# Patient Record
Sex: MALE | Race: WHITE | Employment: UNEMPLOYED | URBAN - METROPOLITAN AREA
[De-identification: names, ages, dates, MRNs, and addresses within clinical notes are randomized per-mention and may not be internally consistent; named-entity substitution may affect disease eponyms.]

---

## 2017-11-27 ENCOUNTER — OFFICE VISIT (OUTPATIENT)
Dept: FAMILY MEDICINE CLINIC | Age: 47
End: 2017-11-27

## 2017-11-27 VITALS
SYSTOLIC BLOOD PRESSURE: 110 MMHG | WEIGHT: 201.2 LBS | HEIGHT: 71 IN | DIASTOLIC BLOOD PRESSURE: 60 MMHG | HEART RATE: 78 BPM | BODY MASS INDEX: 28.17 KG/M2

## 2017-11-27 DIAGNOSIS — Z13.220 SCREENING, LIPID: ICD-10-CM

## 2017-11-27 DIAGNOSIS — Z00.00 ROUTINE GENERAL MEDICAL EXAMINATION AT A HEALTH CARE FACILITY: Primary | ICD-10-CM

## 2017-11-27 DIAGNOSIS — Z12.5 SCREENING FOR MALIGNANT NEOPLASM OF PROSTATE: ICD-10-CM

## 2017-11-27 DIAGNOSIS — E03.9 HYPOTHYROIDISM, UNSPECIFIED TYPE: ICD-10-CM

## 2017-11-27 LAB
CHOLESTEROL, TOTAL: 192 MG/DL (ref 0–199)
HDLC SERPL-MCNC: 44 MG/DL (ref 40–60)
LDL CHOLESTEROL CALCULATED: 122 MG/DL
PROSTATE SPECIFIC ANTIGEN: 0.47 NG/ML (ref 0–4)
T4 FREE: 1.3 NG/DL (ref 0.9–1.8)
TRIGL SERPL-MCNC: 132 MG/DL (ref 0–150)
TSH SERPL DL<=0.05 MIU/L-ACNC: 0.92 UIU/ML (ref 0.27–4.2)
VLDLC SERPL CALC-MCNC: 26 MG/DL

## 2017-11-27 PROCEDURE — 99396 PREV VISIT EST AGE 40-64: CPT | Performed by: FAMILY MEDICINE

## 2017-11-27 PROCEDURE — 36415 COLL VENOUS BLD VENIPUNCTURE: CPT | Performed by: FAMILY MEDICINE

## 2017-11-27 NOTE — PROGRESS NOTES
Chief Complaint   Patient presents with    Annual Exam       HPI / ROS: Ed Sanford presents for evaluation and management of :    # preventive  And other  # screen lipids  # screen prostate  Lab Results   Component Value Date    PSA 0.40 12/01/2016    PSA 0.65 12/15/2015    PSA 0.48 12/15/2014         Patient's allergies, past family, medical, surgical, and social history, Rx and OTC meds are reviewed as part of today's visit and Marked as Reviewed. Objective   Wt Readings from Last 3 Encounters:   11/27/17 201 lb 3.2 oz (91.3 kg)   12/01/16 205 lb 6.4 oz (93.2 kg)   12/15/15 209 lb (94.8 kg)       A&O  /60   Pulse 78   Ht 5' 11\" (1.803 m)   Wt 201 lb 3.2 oz (91.3 kg)   BMI 28.06 kg/m²   Eyes no scleral icterus  Skin no rash no jaundice  Neck no TMG no LAD  Car reg no MGR  Lungs CTA  abd benign soft  Ext no pitting edema  Psych: Judgement and insight are intact, no pressured speech; no psychomotor retardation or agitation; affect and mood congruent    1. Routine general medical examination at a health care facility     2. Screening for malignant neoplasm of prostate  Psa screening   3. Screening, lipid  Lipid Panel   4.  Hypothyroidism, unspecified type  TSH without Reflex    T4, Free     Orders Placed This Encounter   Procedures    TSH without Reflex    T4, Free    Lipid Panel     Order Specific Question:   Is Patient Fasting?/# of Hours     Answer:   yes    Psa screening

## 2017-12-28 RX ORDER — LEVOTHYROXINE SODIUM 137 UG/1
TABLET ORAL
Qty: 90 TABLET | Refills: 3 | Status: SHIPPED | OUTPATIENT
Start: 2017-12-28 | End: 2018-12-22 | Stop reason: SDUPTHER

## 2018-12-12 ENCOUNTER — OFFICE VISIT (OUTPATIENT)
Dept: FAMILY MEDICINE CLINIC | Age: 48
End: 2018-12-12
Payer: COMMERCIAL

## 2018-12-12 VITALS
WEIGHT: 210.25 LBS | DIASTOLIC BLOOD PRESSURE: 70 MMHG | HEART RATE: 80 BPM | HEIGHT: 71 IN | OXYGEN SATURATION: 99 % | SYSTOLIC BLOOD PRESSURE: 118 MMHG | BODY MASS INDEX: 29.44 KG/M2

## 2018-12-12 DIAGNOSIS — E03.9 HYPOTHYROIDISM, UNSPECIFIED TYPE: ICD-10-CM

## 2018-12-12 DIAGNOSIS — Z00.00 ROUTINE GENERAL MEDICAL EXAMINATION AT A HEALTH CARE FACILITY: Primary | ICD-10-CM

## 2018-12-12 DIAGNOSIS — Z13.220 SCREENING, LIPID: ICD-10-CM

## 2018-12-12 DIAGNOSIS — Z12.5 SCREENING FOR MALIGNANT NEOPLASM OF PROSTATE: ICD-10-CM

## 2018-12-12 LAB
CHOLESTEROL, TOTAL: 189 MG/DL (ref 0–199)
HDLC SERPL-MCNC: 42 MG/DL (ref 40–60)
LDL CHOLESTEROL CALCULATED: 121 MG/DL
PROSTATE SPECIFIC ANTIGEN: 0.46 NG/ML (ref 0–4)
T4 FREE: 1.4 NG/DL (ref 0.9–1.8)
TRIGL SERPL-MCNC: 131 MG/DL (ref 0–150)
TSH SERPL DL<=0.05 MIU/L-ACNC: 1.28 UIU/ML (ref 0.27–4.2)
VLDLC SERPL CALC-MCNC: 26 MG/DL

## 2018-12-12 PROCEDURE — 36415 COLL VENOUS BLD VENIPUNCTURE: CPT | Performed by: FAMILY MEDICINE

## 2018-12-12 PROCEDURE — 99396 PREV VISIT EST AGE 40-64: CPT | Performed by: FAMILY MEDICINE

## 2018-12-12 ASSESSMENT — PATIENT HEALTH QUESTIONNAIRE - PHQ9
SUM OF ALL RESPONSES TO PHQ QUESTIONS 1-9: 0
SUM OF ALL RESPONSES TO PHQ9 QUESTIONS 1 & 2: 0
2. FEELING DOWN, DEPRESSED OR HOPELESS: 0
SUM OF ALL RESPONSES TO PHQ QUESTIONS 1-9: 0
1. LITTLE INTEREST OR PLEASURE IN DOING THINGS: 0

## 2018-12-24 RX ORDER — LEVOTHYROXINE SODIUM 137 UG/1
TABLET ORAL
Qty: 90 TABLET | Refills: 0 | Status: SHIPPED | OUTPATIENT
Start: 2018-12-24 | End: 2019-03-31 | Stop reason: SDUPTHER

## 2019-12-05 ENCOUNTER — OFFICE VISIT (OUTPATIENT)
Dept: FAMILY MEDICINE CLINIC | Age: 49
End: 2019-12-05
Payer: COMMERCIAL

## 2019-12-05 VITALS
WEIGHT: 220.2 LBS | HEART RATE: 76 BPM | OXYGEN SATURATION: 97 % | RESPIRATION RATE: 17 BRPM | SYSTOLIC BLOOD PRESSURE: 129 MMHG | DIASTOLIC BLOOD PRESSURE: 85 MMHG | BODY MASS INDEX: 30.83 KG/M2 | HEIGHT: 71 IN

## 2019-12-05 DIAGNOSIS — Z13.220 SCREENING, LIPID: ICD-10-CM

## 2019-12-05 DIAGNOSIS — E03.9 ACQUIRED HYPOTHYROIDISM: ICD-10-CM

## 2019-12-05 DIAGNOSIS — Z12.5 SCREENING FOR MALIGNANT NEOPLASM OF PROSTATE: ICD-10-CM

## 2019-12-05 DIAGNOSIS — Z00.00 ROUTINE GENERAL MEDICAL EXAMINATION AT A HEALTH CARE FACILITY: Primary | ICD-10-CM

## 2019-12-05 LAB
CHOLESTEROL, TOTAL: 181 MG/DL (ref 0–199)
HDLC SERPL-MCNC: 45 MG/DL (ref 40–60)
LDL CHOLESTEROL CALCULATED: 106 MG/DL
PROSTATE SPECIFIC ANTIGEN: 0.49 NG/ML (ref 0–4)
T4 FREE: 1.5 NG/DL (ref 0.9–1.8)
TRIGL SERPL-MCNC: 150 MG/DL (ref 0–150)
TSH SERPL DL<=0.05 MIU/L-ACNC: 1.67 UIU/ML (ref 0.27–4.2)
VLDLC SERPL CALC-MCNC: 30 MG/DL

## 2019-12-05 PROCEDURE — 36415 COLL VENOUS BLD VENIPUNCTURE: CPT | Performed by: FAMILY MEDICINE

## 2019-12-05 PROCEDURE — 99396 PREV VISIT EST AGE 40-64: CPT | Performed by: FAMILY MEDICINE

## 2019-12-05 ASSESSMENT — PATIENT HEALTH QUESTIONNAIRE - PHQ9
SUM OF ALL RESPONSES TO PHQ QUESTIONS 1-9: 0
1. LITTLE INTEREST OR PLEASURE IN DOING THINGS: 0
SUM OF ALL RESPONSES TO PHQ QUESTIONS 1-9: 0
2. FEELING DOWN, DEPRESSED OR HOPELESS: 0
SUM OF ALL RESPONSES TO PHQ9 QUESTIONS 1 & 2: 0

## 2020-04-07 RX ORDER — LEVOTHYROXINE SODIUM 137 UG/1
TABLET ORAL
Qty: 90 TABLET | Refills: 3 | Status: SHIPPED | OUTPATIENT
Start: 2020-04-07 | End: 2021-04-26

## 2020-10-12 ENCOUNTER — OFFICE VISIT (OUTPATIENT)
Dept: FAMILY MEDICINE CLINIC | Age: 50
End: 2020-10-12
Payer: COMMERCIAL

## 2020-10-12 VITALS
OXYGEN SATURATION: 98 % | BODY MASS INDEX: 30.49 KG/M2 | SYSTOLIC BLOOD PRESSURE: 128 MMHG | HEART RATE: 78 BPM | RESPIRATION RATE: 16 BRPM | HEIGHT: 71 IN | DIASTOLIC BLOOD PRESSURE: 72 MMHG | WEIGHT: 217.8 LBS

## 2020-10-12 LAB
CHOLESTEROL, TOTAL: 202 MG/DL (ref 0–199)
HDLC SERPL-MCNC: 42 MG/DL (ref 40–60)
LDL CHOLESTEROL CALCULATED: 130 MG/DL
PROSTATE SPECIFIC ANTIGEN: 0.45 NG/ML (ref 0–4)
T4 FREE: 1.6 NG/DL (ref 0.9–1.8)
TRIGL SERPL-MCNC: 150 MG/DL (ref 0–150)
TSH SERPL DL<=0.05 MIU/L-ACNC: 3.53 UIU/ML (ref 0.27–4.2)
VLDLC SERPL CALC-MCNC: 30 MG/DL

## 2020-10-12 PROCEDURE — 36415 COLL VENOUS BLD VENIPUNCTURE: CPT | Performed by: FAMILY MEDICINE

## 2020-10-12 PROCEDURE — 99396 PREV VISIT EST AGE 40-64: CPT | Performed by: FAMILY MEDICINE

## 2020-10-12 ASSESSMENT — PATIENT HEALTH QUESTIONNAIRE - PHQ9
1. LITTLE INTEREST OR PLEASURE IN DOING THINGS: 0
2. FEELING DOWN, DEPRESSED OR HOPELESS: 0
SUM OF ALL RESPONSES TO PHQ QUESTIONS 1-9: 0
SUM OF ALL RESPONSES TO PHQ QUESTIONS 1-9: 0
SUM OF ALL RESPONSES TO PHQ9 QUESTIONS 1 & 2: 0

## 2020-10-16 ENCOUNTER — TELEPHONE (OUTPATIENT)
Dept: FAMILY MEDICINE CLINIC | Age: 50
End: 2020-10-16

## 2021-02-01 ENCOUNTER — TELEPHONE (OUTPATIENT)
Dept: FAMILY MEDICINE CLINIC | Age: 51
End: 2021-02-01

## 2021-02-01 ENCOUNTER — VIRTUAL VISIT (OUTPATIENT)
Dept: FAMILY MEDICINE CLINIC | Age: 51
End: 2021-02-01
Payer: COMMERCIAL

## 2021-02-01 DIAGNOSIS — M54.12 CERVICAL RADICULOPATHY: Primary | ICD-10-CM

## 2021-02-01 PROCEDURE — 99213 OFFICE O/P EST LOW 20 MIN: CPT | Performed by: FAMILY MEDICINE

## 2021-02-01 RX ORDER — TIZANIDINE 2 MG/1
1 TABLET ORAL NIGHTLY
COMMUNITY
Start: 2021-01-12

## 2021-02-01 RX ORDER — GABAPENTIN 300 MG/1
1 CAPSULE ORAL DAILY
COMMUNITY
Start: 2021-01-12

## 2021-02-01 ASSESSMENT — PATIENT HEALTH QUESTIONNAIRE - PHQ9
1. LITTLE INTEREST OR PLEASURE IN DOING THINGS: 0
SUM OF ALL RESPONSES TO PHQ QUESTIONS 1-9: 0
2. FEELING DOWN, DEPRESSED OR HOPELESS: 0

## 2021-02-01 NOTE — TELEPHONE ENCOUNTER
Called and spoke with pt. Had VV today with Dr Faye Andersen. Verified insurance.  He states he will pay copayment on [employer]

## 2021-02-01 NOTE — PROGRESS NOTES
2021    Ciera Buchanan (:  1970) is a 53 y.o. male, here for evaluation of the following chief complaint(s):  Follow-up (Cervical radiculopathy)      ASSESSMENT/PLAN:     Diagnosis Orders   1. Cervical radiculopathy  Kit Edwards MD, Orthopedic Surgery, [facility]    pt wants second opinion ref Dr. Martha Thomas       No follow-ups on file. An electronic signature was used to authenticate this note. @SIG@    SUBJECTIVE/OBJECTIVE:    HPI / Dimple Chow is wanting more help with his c-spine and has requested I review his films and actions taken. He has seen [facility]. If he takes a longer walk he loses fine motor control in his Right hand and function normalizes within 1 hour. But this is a concerning reproducible symptom every time he walks can't type correctly. Films reviewed and he has some degenerative disc disease at C5-6.       Wt Readings from Last 3 Encounters:   10/12/20 217 lb 12.8 oz (98.8 kg)   19 220 lb 3.2 oz (99.9 kg)   18 210 lb 4 oz (95.4 kg)       BP Readings from Last 3 Encounters:   10/12/20 128/72   19 129/85   18 118/70           TELEHEALTH EVALUATION -- Audio/Visual (During [place]-85 public health emergency) Karen Rosales (:  1970) is being evaluated by a Virtual Visit (video visit) encounter to address concerns as mentioned above. A caregiver was present when appropriate. Due to this being a TeleHealth encounter (During [facility] public health emergency), evaluation of the following organ systems was limited: Vitals/Constitutional/EENT/Resp/CV/GI//MS/Neuro/Skin/Heme-Lymph-Imm. Pursuant to the emergency declaration under the [address] [place] [address] and the [employer] and [employer] Act, this Virtual Visit was conducted with patient's (and/or legal guardian's) consent, to reduce the patient's risk of exposure to COVID-19 and provide necessary medical care. The patient (and/or legal guardian) has also been advised to contact this office for worsening conditions or problems, and seek emergency medical treatment and/or call 911 if deemed necessary. Patient identification was verified at the start of the visit: Yes    Total time spent on this encounter: Not billed by time    Services were provided through a video synchronous discussion virtually to substitute for in-person clinic visit. Patient and provider were located at their individual homes. --Sanaz Garcia MD on 2021 at 1:29 PM    An electronic signature was used to authenticate this note.

## 2021-04-25 DIAGNOSIS — E03.9 HYPOTHYROIDISM, UNSPECIFIED TYPE: ICD-10-CM

## 2021-04-26 RX ORDER — LEVOTHYROXINE SODIUM 137 UG/1
TABLET ORAL
Qty: 90 TABLET | Refills: 3 | Status: SHIPPED | OUTPATIENT
Start: 2021-04-26 | End: 2022-04-20

## 2021-05-18 ENCOUNTER — OFFICE VISIT (OUTPATIENT)
Dept: ORTHOPEDIC SURGERY | Age: 51
End: 2021-05-18
Payer: COMMERCIAL

## 2021-05-18 DIAGNOSIS — M47.812 CERVICAL SPONDYLOSIS: Primary | ICD-10-CM

## 2021-05-18 DIAGNOSIS — M50.30 DDD (DEGENERATIVE DISC DISEASE), CERVICAL: ICD-10-CM

## 2021-05-18 DIAGNOSIS — M43.16 SPONDYLOLISTHESIS OF LUMBAR REGION: ICD-10-CM

## 2021-05-18 PROCEDURE — 99204 OFFICE O/P NEW MOD 45 MIN: CPT | Performed by: PHYSICIAN ASSISTANT

## 2021-05-18 NOTE — PROGRESS NOTES
New Patient: CERVICAL SPINE    Referring Provider:  No ref. provider found    CHIEF COMPLAINT:    Chief Complaint   Patient presents with    Neck Pain     cervical, hx of disc herniation 2006, rt arm and rt hand involvement, PT in 2006       HISTORY OF PRESENT ILLNESS:   Mr. Maureen Hensley is a pleasant 49 y.o. old male who presents today for evaluation regarding Mr. Milvia Ott's neck and back pain. He states the pain began insidiously about 15 years ago. Patient states he has a history of a herniated disc in his cervical spine in 2006 and was placed on a steroid taper along with physical therapy which completely resolved his symptoms. Since then he has had intermittent neck pain since then. He rates his neck pain 4/10 and right shoulder and arm pain 3/10. He describes the pain as intermittent and depending on level of activity. The arm pain radiates to his medial forearm and occasionally into his hand. He as numbness and tingling in a C7 distribution on the right. He denies weakness of his right or left arm. He rates his back pain 3/10 and right leg pain 3/10. The leg pain is noted into his anterior right shin and posterior right calf. He has mild numbness and tingling in his right leg. He denies weakness of his left leg. He denies gait abnormality and bowel or bladder dysfunction. The pain at times interferes with his sleep. He has tried physical therapy in 2006 with benefit. He takes Zanaflex and gabapentin as needed  Pain Assessment  Location of Pain: Neck  Severity of Pain: 4  Quality of Pain: Other (Comment)  Duration of Pain: Other (Comment)  Frequency of Pain: Other (Comment)]    Current/Past Treatment:   · Physical Therapy: None recently  · Chiropractic: None  · Injection: None  · Medications: Gabapentin and Zanaflex    Past Medical History:   No past medical history on file. Past Surgical History:     No past surgical history on file.     Current Medications:     Current Outpatient Medications:   levothyroxine (SYNTHROID) 137 MCG tablet, TAKE 1 TABLET DAILY, Disp: 90 tablet, Rfl: 3    gabapentin (NEURONTIN) 300 MG capsule, Take 1 capsule by mouth daily. , Disp: , Rfl:     tiZANidine (ZANAFLEX) 2 MG tablet, Take 1 tablet by mouth nightly, Disp: , Rfl:     Allergies:  Patient has no known allergies. Social History:    reports that he has never smoked. He has never used smokeless tobacco.    Family History:   No family history on file. REVIEW OF SYSTEMS: Full ROS noted & scanned   CONSTITUTIONAL: Denies unexplained weight loss, fevers, chills or fatigue  NEUROLOGICAL: Denies unsteady gait or progressive weakness  MUSCULOSKELETAL: Denies joint swelling or redness  PSYCHOLOGICAL: Denies anxiety, depression   SKIN: Denies skin changes, delayed healing, rash, itching   HEMATOLOGIC: Denies easy bleeding or bruising  ENDOCRINE: Denies excessive thirst, urination, heat/cold  RESPIRATORY: Denies current dyspnea, cough  GI: Denies nausea, vomiting, diarrhea   : Denies bowel or bladder issues       PHYSICAL EXAM:    Vitals: There were no vitals taken for this visit. GENERAL EXAM:  · General Apparence: Patient is adequately groomed with no evidence of malnutrition. · Orientation: The patient is oriented to time, place and person. · Mood & Affect:The patient's mood and affect are appropriate   · Vascular: Examination reveals no swelling tenderness in upper or lower extremities. Good capillary refill  · Lymphatic: The lymphatic examination bilaterally reveals all areas to be without enlargement or induration  · Sensation: Sensation is intact without deficit  · Coordination/Balance: Good coordination. Tandem walking normal.     CERVICAL EXAMINATION:  · Inspection: Local inspection shows no step-off or bruising. Cervical alignment is normal.     · Palpation: No evidence of tenderness at the midline, and trapezius. Paraspinal tenderness is present. There is no step-off or paraspinal spasm.    · Range of Motion: Cervical flexion, extension, and rotation are mildly reduced with pain. · Strength: 5/5 bilateral upper extremities   · Special Tests:    ·   Spurling's & Lo's negative bilaterally. ·  Cubital and Carpal tunnel Tinel's negative bilaterally. · Skin:There are no rashes, ulcerations or lesions in right & left upper extremities. · Reflexes: Bilaterally triceps, biceps and brachioradialis are 2+. Clonus absent bilaterally at the feet. · Gait & station: normal, patient ambulates without assistance       · Additional Examinations:       · RIGHT UPPER EXTREMITY:  Inspection/examination of the right upper extremity does not show any tenderness, deformity or injury. Range of motion is unremarkable. There is no gross instability. There are no rashes, ulcerations or lesions. Strength and tone are normal.  · LEFT UPPER EXTREMITY: Inspection/examination of the left upper extremity does not show any tenderness, deformity or injury. Range of motion is unremarkable. There is no gross instability. There are no rashes, ulcerations or lesions. Strength and tone are normal.    LUMBAR/SACRAL EXAMINATION:  · Inspection: Local inspection shows no step-off or bruising. Lumbar alignment is normal.  Sagittal and Coronal balance is neutral.      · Palpation:   No evidence of tenderness at the midline. No tenderness bilaterally at the paraspinal or trochanters. There is no step-off or paraspinal spasm. · Range of Motion: Lumbar flexion, extension and rotation are mildly limited due to pain. · Strength:   Strength testing is 5/5 in all muscle groups tested. · Special Tests:   Straight leg raise and crossed SLR negative. Leg length and pelvis level.  0 out of 5 Hazel's signs. · Skin: There are no rashes, ulcerations or lesions. · Reflexes: Reflexes are symmetrically 2+ at the patellar and ankle tendons. Clonus absent bilaterally at the feet.   · Gait & station: normal, patient ambulates without assistance    · Additional Examinations:   · RIGHT LOWER EXTREMITY: Inspection/examination of the right lower extremity does not show any tenderness, deformity or injury. Range of motion is unremarkable. There is no gross instability. There are no rashes, ulcerations or lesions. Strength and tone are normal.  · LEFT LOWER EXTREMITY:  Inspection/examination of the left lower extremity does not show any tenderness, deformity or injury. Range of motion is unremarkable. There is no gross instability. There are no rashes, ulcerations or lesions. Strength and tone are normal.    Diagnostic Testing:  X-rays 4 views of the lumbar spine and 4 views of the cervical spine that were obtained on 11/12/2022 include AP, lateral, flexion and extension views both of the lumbar spine and cervical spine. X-rays of the lumbar spine shows a pars defect at L5 with grade 1 spondylolisthesis of L5 on S1.  X-rays of the cervical spine reveal loss of the cervical lordosis with degenerative disc disease most noted at C5-6. Impression:   Lumbar spondylolisthesis L5 on S1  DDD cervical spine C5-6    1. Cervical spondylosis    2. DDD (degenerative disc disease), cervical    3. Spondylolisthesis of lumbar region        Plan:      · We discussed the diagnosis and treatment options including observation, physical therapy, epidural injections, or additional imaging. He wishes to proceed with home cervical traction unit, and referral to outpatient physical therapy for both cervical and lumbar range of motion and progressive strengthening exercises. If the patient finds that he has had no significant improvement with physical therapy he will contact the office for scheduling of an MRI of either the cervical spine or the lumbar spine. .    Follow Up: As needed    Patient examined and note dictated by Virgil De La Garza PA-C.

## 2021-05-27 ENCOUNTER — HOSPITAL ENCOUNTER (OUTPATIENT)
Dept: PHYSICAL THERAPY | Age: 51
Setting detail: THERAPIES SERIES
Discharge: HOME | End: 2021-05-27
Payer: COMMERCIAL

## 2021-05-27 PROCEDURE — 97110 THERAPEUTIC EXERCISES: CPT

## 2021-05-27 PROCEDURE — 97162 PT EVAL MOD COMPLEX 30 MIN: CPT

## 2021-05-27 NOTE — PLAN OF CARE
Kofi Moreno [address]                                                       Physical Therapy Certification    Dear Referring Practitioner: Som Pastor,    We had the pleasure of evaluating the following patient for physical therapy services at [address] [place] [address]. A summary of our findings can be found in the initial assessment below. This includes our plan of care. If you have any questions or concerns regarding these findings, please do not hesitate to contact me at the office phone number checked above. Thank you for the referral.       Physician Signature:_______________________________Date:__________________  By signing above (or electronic signature), therapists plan is approved by physician      Patient: Leah Kim   : 1970   MRN: [id number]  Referring Physician: Referring Practitioner: Som Pastor      Evaluation Date: 2021      Medical Diagnosis Information:  Diagnosis: M47.22 Spondylosis with radiculopathy, cervical region   Treatment Diagnosis: M47.22 Spondylosis with radiculopathy, cervical region                                         Insurance information: PT Insurance Information: [place] - 60 PT visits; Massage therapy not covered    Precautions/ Contra-indications: Cyst removed in face in . [place] teeth in . C-SSRS Triggered by Intake questionnaire (Past 2 wk assessment):   [x] No, Questionnaire did not trigger screening.   [] Yes, Patient intake triggered further evaluation      [] C-SSRS Screening completed  [] PCP notified via Plan of Care  [] Emergency services notified     Latex Allergy:  [x]NO      []YES  Preferred Language for Healthcare:   [x]English       []Other:      SUBJECTIVE: Patient stated complaint:  Patient herniated a disc in his neck in . Patient had physical therapy for about 20 visits back in . Pain never really went fully away at that time.  Pain has progressively gotten worse since then. Patient still does some of the stretching provided by PT in the mornings when he first wakes up. Stretching sometimes helps decrease pain for the day, sometimes does not change sxs. Patient notes constant tightness in both UT with pain at the base of his neck. Patient also notes recent onset of numbness in the R medial forearm into the three middle fingers (no pinky or thumb) that affected his ability to type accurately and quickly following a walk or any type of cardiovascular activity. Numbness can take up to several hours to go away after walking. Patient also notes a constant, low level headache that seems to get worse when the neck pain gets worse. When neck pain is really bad, patient can also have dizziness. Relevant Medical History:  Cyst removed in face in 1990. [place] teeth in 1989. Functional Disability Index: 52% disability - NDI (26/50)    Pain Scale: 3/10 at start of session. 6/10 at worst. 3/10 at best.   Easing factors: Long hot shower. Putting heat on it. Stretching. Using more pillows. Provocative factors: Cardiovascular activity brings on numbness in R forearm and fingers. Stretching can bring on the numbness. Looking into blind spots while driving. Laying on his back. Type: [x]Constant   [x]Intermittent  [x]Radiating [x]Localized []other:     Numbness/Tingling: Numbness in R medial forearm and middle three fingers (no pinky or thumb). Occupation/School:  Patient is an . Patient primarily sits at a desk. He is currently working from home. Living Status/Prior Level of Function: Independent with ADLs and IADLs. Patient loves playing golf. Before the disc herniation, patient could play an unlimited amount of golf. Currently, he can only hit 10-15 golf balls and he is done for several months at a time. Patient also enjoys playing handball. He can tolerate playing twice a week without really irritating neck pain sxs.      OBJECTIVE:     CERV ROM Cervical Flexion 42 P! Onsets at 30 degrees, numbness down R arm onsets after that point. Cervical Extension 60 Hinges at C5/C6. Left Right   Cervical SB 40 Stretch down R-sided neck. 40 Stretch down L-sided neck. Cervical rotation 75 Starts to feel pain at 55 degrees in L-sided base of the neck. 59 Starts to feel pain at 40 deg in R-sided base of the neck. Thoracic rotation Minimally restricted. Minimally restricted. Thoracic extension Severely restricted. Hinges at T12/L1. UE ROM  Left Right   Shoulder Flex     Shoulder Abd     Shoulder ER     Shoulder IR     Elbow flex/ext     Wrist flex/ext/pro/sup     Finger flex/ext/opposition     Shoulder AROM WNL w OP WNL WNL   UE Strength  Left Right   Shoulder Flex 4+/5 4/5   Shoulder Scap     Shoulder ABD (C5) 4+/5 Mild p! In L-sided base of the neck. 4/5   Shoulder ER      Shoulder IR     Elbow Flex (C5) 5/5 5/5   Elbow Ext (C7 Radial) 5/5 5/5   Wrist Flex (C6 Radial) 5/5 5/5   Wrist Ext (C7 Radial) 5/5 5/5   Finger flex (C8 median) 5/5 5/5   Finger ext (C7 Radial-PIN) 5/5 5/5   APB (T1 Median) 5/5 5/5   Finger Abd (T1 Ulnar) 5/5 5/5   UE myotomes WNL        Reflexes Normal Abnormal Comments   S1-2 Seated achilles [] []    S1-2 Prone knee bend [] []    L3-4 Patellar tendon [] []    C5-6 Biceps [x] []    C6 Brachioradialis [x] []    C7-8 Triceps [x] []      Balance:     Joint mobility: Cervical and thoracic  and UPAs TBA   []Normal    []Hypo   []Hyper    Palpation:     Functional Mobility/Transfers:     Posture: Forward head posture in static sitting; decreased thoracic kyphosis; decreased lumbar lordosis; small dowager's hump present; anterior and internally rotated humerus bilaterally in static sitting; significant posterior transition zone at C5/C6. Bandages/Dressings/Incisions: N/A    Gait: (include devices/WB status):   WNL    Neurodynamics:     Orthopedic Special Tests:     Screening Testing  Normal Abnormal N/A Comments   Babinski Test [] [] []    Lo Test [] [] []    Inverted Sup Sign [] [] []    Alar Ligament Test (spinous kick) [] [] []    Transverse Ligament Test [] [] []    Sharp-Kimberly Test [] [] []    Hautards Test [] [] []    Vertebral Artery Test [] [] []             Orthopedic Special Tests Normal Abnormal N/A Comments   Spurling Maneuver:  [] [] [] TBA   Distraction testing: [] [] [] TBA   ULTT [] [] [] TBA   Rotation < 60 deg involved side [x] [] []    Shoulder Abd test [] [] []    Cerv Rot/Lat Flex- 1st Rib [] [] []    Deep Neck Flex/endurance testing [] [] []    Craniocerv Flex testing Christina Pepper [] [] []    End Range Tolerance testing. [] [] []     [] [] []    *clinical cluster for cervical radiculopathy          [x] Patient history, allergies, meds reviewed. Medical chart reviewed. See intake form. Review Of Systems (ROS):  [x]Performed Review of systems (Integumentary, CardioPulmonary, Neurological) by intake and observation. Intake form has been scanned into medical record. Patient has been instructed to contact their primary care physician regarding ROS issues if not already being addressed at this time.       Co-morbidities/Complexities (which will affect course of rehabilitation):   []None           Arthritic conditions   []Rheumatoid arthritis (M05.9)  []Osteoarthritis (M19.91)   Cardiovascular conditions   []Hypertension (I10)  []Hyperlipidemia (E78.5)  []Angina pectoris (I20)  []Atherosclerosis (I70)   Musculoskeletal conditions   []Disc pathology   []Congenital spine pathologies   []Prior surgical intervention  []Osteoporosis (M81.8)  []Osteopenia (M85.8)   Endocrine conditions   []Hypothyroid (E03.9)  []Hyperthyroid Gastrointestinal conditions   []Constipation (G29.28)   Metabolic conditions   []Morbid obesity (E66.01)  []Diabetes type 1(E10.65) or 2 (E11.65)   []Neuropathy (G60.9)     Pulmonary conditions   []Asthma (J45)  []Coughing   []COPD (J44.9)   Psychological Disorders  []Anxiety (F41.9)  []Depression (F32.9) []Other:   [x]Other:     See above. Barriers to/and or personal factors that will affect rehab potential:              [x]Age  []Sex              []Motivation/Lack of Motivation                        []Co-Morbidities              []Cognitive Function, education/learning barriers              []Environmental, home barriers              [x]profession/work barriers  []past PT/medical experience  [x]other:  Chronic nature of neck pain  Justification: Increasing age, the chronic nature of patient's neck pain sxs, and the postures and positions he has to maintain at work indicate increased likelihood for prolonged prognosis. Falls Risk Assessment (30 days):   [x] Falls Risk assessed and no intervention required.   [] Falls Risk assessed and Patient requires intervention due to being higher risk   TUG score (>12s at risk):     [] Falls education provided, including       ASSESSMENT:    Functional Impairments:     [x]Noted cervical/thoracic/GHJ joint hypomobility   []Noted cervical/thoracic/GHJ joint hypermobility   [x]Decreased cervical/UE functional ROM   [x]Noted Headache pain aggravated by neck movements with/without dizziness   []Abnormal reflexes/sensation/myotomal/dermatomal deficits   [x]Decreased DCF control or ability to hold head up   [x]Decreased RC/scapular/core strength and neuromuscular control    [x]Decreased UE functional strength   []other:      Functional Activity Limitations (from functional questionnaire and intake)   [x]Reduced ability to tolerate prolonged functional positions   [x]Reduced ability or difficulty with changes of positions or transfers between positions   [x]Reduced ability to maintain good posture and demonstrate good body mechanics with sitting, bending, and lifting   [x] Reduced ability or tolerance with driving and/or computer work   [x]Reduced ability to perform lifting, reaching, carrying tasks   []Reduced ability to concentrate   [x]Reduced ability to sleep    [x]Reduced ability to tolerate any impact through UE or spine   []Reduced ability to ambulate prolonged functional periods/distances   []other:    Participation Restrictions   [x]Reduced participation in self care activities   [x]Reduced participation in home management activities   [x]Reduced participation in work activities   [x]Reduced participation in social activities. [x]Reduced participation in sport/recreational activities. Classification/Subgrouping:   [x]signs/symptoms consistent with neck pain with mobility deficits     []signs/symptoms consistent with neck pain with movement coordinated impairments    [x]signs/symptoms consistent with neck pain with radiating pain    [x]signs/symptoms consistent with neck pain with headaches (cervicogenic)    []Signs/symptoms consistent with nerve root involvement including myotome & dermatome dysfunction   []sign/symptoms consistent with facet dysfunction of cervical and thoracic spine    []signs/symptoms consistent suggesting central cord compression/UMN syndromes   [x]signs/symptoms consistent with discogenic cervical pain   []signs/symptoms consistent with rib dysfunction   [x]signs/symptoms consistent with postural dysfunction   []signs/symptoms consistent with shoulder pathology    []signs/symptoms consistent with post-surgical status including decreased ROM, strength and function.    []signs/symptoms consistent with pathology which may benefit from Dry Needling  []signs/symptoms which may limit the use of advanced manual therapy techniques: (Hypertension, recent trauma, intolerance to end range positions, prior TIA, visual issues, UE myotomes loss )     Prognosis/Rehab Potential:      []Excellent   [x]Good    []Fair   []Poor    Tolerance of evaluation/treatment:    []Excellent   [x]Good    []Fair   []Poor    Physical Therapy Evaluation Complexity Justification  [] A history of present problem with:  [] no personal factors and/or comorbidities that impact the plan of care;  [x]1-2 personal factors and/or comorbidities that impact the plan of care  []3 personal factors and/or comorbidities that impact the plan of care  [] An examination of body systems using standardized tests and measures addressing any of the following: body structures and functions (impairments), activity limitations, and/or participation restrictions;:  [] a total of 1-2 or more elements   [x] a total of 3 or more elements   [] a total of 4 or more elements   [] A clinical presentation with:  [] stable and/or uncomplicated characteristics   [x] evolving clinical presentation with changing characteristics  [] unstable and unpredictable characteristics;   [] Clinical decision making of [] low, [x] moderate, [] high complexity using standardized patient assessment instrument and/or measurable assessment of functional outcome. [] EVAL (LOW) 19539 (typically 30 minutes face-to-face)  [x] EVAL (MOD) 69183 (typically 30 minutes face-to-face)  [] EVAL (HIGH) 70712 (typically 45 minutes face-to-face)  [] RE-EVAL     Frequency/Duration:  2 days per week for 6 Weeks:  Interventions:  [x]  Therapeutic exercise including: strength training, ROM, for cervical spine,scapula, core and Upper extremity, including postural re-education. [x]  NMR activation and proprioception for Deep cervical flexors, periscapular and RC muscles and Core, including postural re-education. [x]  Manual therapy as indicated for C/T spine, ribs, Soft tissue to include: Dry Needling/IASTM, STM, PROM, Gr I-IV mobilizations, manipulation. [x] Modalities as needed that may include: thermal agents, E-stim, Biofeedback, US, iontophoresis as indicated  [x] Patient education on joint protection, postural re-education, activity modification, progression of HEP. HEP instruction: (see scanned forms)    GOALS:  Patient stated goal: Less pain for improved sleep. Be able to golf regularly without difficulty.   [x] Progressing: [] Met: [] Not Met: [] Adjusted    Therapist goals for Patient:   Short Term Goals: To be achieved in: 2 weeks  1. Independent in HEP and progression per patient tolerance, in order to prevent re-injury. [x] Progressing: [] Met: [] Not Met: [] Adjusted  2. Patient will have a decrease in pain to facilitate improvement in movement, function, and ADLs as indicated by Functional Deficits. [x] Progressing: [] Met: [] Not Met: [] Adjusted    Long Term Goals: To be achieved in: 6 weeks  1. Disability index score of 20% or less for the NDI to assist with reaching prior level of function. [x] Progressing: [] Met: [] Not Met: [] Adjusted  2. Patient will be able to sit for >2 hours at work without increased symptoms or restriction. [x] Progressing: [] Met: [] Not Met: [] Adjusted  3. Patient will be able to look into his blind spots while driving without increased symptoms or restriction. [x] Progressing: [] Met: [] Not Met: [] Adjusted   4. Patient will be able to sleep in any position of choice without increased symptoms or restriction. [x] Progressing: [] Met: [] Not Met: [] Adjusted   5. Patient will be able to golf without increased symptoms or restriction.    [x] Progressing: [] Met: [] Not Met: [] Adjusted      Electronically signed by:  Zoe Andre, PT, DPT

## 2021-05-27 NOTE — FLOWSHEET NOTE
manipulation       CT MT/Mobs       PROM MT                     NMR Re-education  Min:  0                                                                   Therapeutic Exercise and NMR EXR  [x] (21554) Provided verbal/tactile cueing for activities related to strengthening, flexibility, endurance, ROM  for improvements in scapular, scapulothoracic and UE control with self care, reaching, carrying, lifting, house/yardwork, driving/computer work. [x] (60854) Provided verbal/tactile cueing for activities related to improving balance, coordination, kinesthetic sense, posture, motor skill, proprioception  to assist with  scapular, scapulothoracic and UE control with self care, reaching, carrying, lifting, house/yardwork, driving/computer work. Therapeutic Activities:    [x] (81100 or 38402) Provided verbal/tactile cueing for activities related to improving balance, coordination, kinesthetic sense, posture, motor skill, proprioception and motor activation to allow for proper function of scapular, scapulothoracic and UE control with self care, carrying, lifting, driving/computer work.      Home Exercise Program:    [x] (70264) Reviewed/Progressed HEP activities related to strengthening, flexibility, endurance, ROM of scapular, scapulothoracic and UE control with self care, reaching, carrying, lifting, house/yardwork, driving/computer work  [x] (62315) Reviewed/Progressed HEP activities related to improving balance, coordination, kinesthetic sense, posture, motor skill, proprioception of scapular, scapulothoracic and UE control with self care, reaching, carrying, lifting, house/yardwork, driving/computer work      Manual Treatments:  PROM / STM / Oscillations-Mobs:  G-I, II, III, IV (PA's, Inf., Post.)  [x] (43663) Provided manual therapy to mobilize soft tissue/joints of cervical/CT, scapular GHJ and UE for the purpose of modulating pain, promoting relaxation,  increasing ROM, reducing/eliminating soft tissue swelling/inflammation/restriction, improving soft tissue extensibility and allowing for proper ROM for normal function with self care, reaching, carrying, lifting, house/yardwork, driving/computer work    Modalities:      Charges:  Timed Code Treatment Minutes: 20   Total Treatment Minutes: 50       [] EVAL (LOW) 59885 (typically 20 minutes face-to-face)  [x] EVAL (MOD) 93583 (typically 30 minutes face-to-face)  [] EVAL (HIGH) 23913 (typically 45 minutes face-to-face)  [] RE-EVAL     [x] GY(06674) x 1    [] IONTO (62408)  [] NMR (48466) x     [] VASO (05298)  [] Manual (94939) x     [] Other:  [] TA (42134)x     [] Mech Traction (13400)  [] ES(attended) (90987)     [] ES (un) (64244):       GOALS:  Patient stated goal: Less pain for improved sleep. Be able to golf regularly without difficulty. [x] Progressing: [] Met: [] Not Met: [] Adjusted    Therapist goals for Patient:   Short Term Goals: To be achieved in: 2 weeks  1. Independent in HEP and progression per patient tolerance, in order to prevent re-injury. [x] Progressing: [] Met: [] Not Met: [] Adjusted  2. Patient will have a decrease in pain to facilitate improvement in movement, function, and ADLs as indicated by Functional Deficits. [x] Progressing: [] Met: [] Not Met: [] Adjusted    Long Term Goals: To be achieved in: 6 weeks  1. Disability index score of 20% or less for the NDI to assist with reaching prior level of function. [x] Progressing: [] Met: [] Not Met: [] Adjusted  2. Patient will be able to sit for >2 hours at work without increased symptoms or restriction. [x] Progressing: [] Met: [] Not Met: [] Adjusted  3. Patient will be able to look into his blind spots while driving without increased symptoms or restriction. [x] Progressing: [] Met: [] Not Met: [] Adjusted   4. Patient will be able to sleep in any position of choice without increased symptoms or restriction. [x] Progressing: [] Met: [] Not Met: [] Adjusted   5.  Patient will be able to golf without increased symptoms or restriction. [x] Progressing: [] Met: [] Not Met: [] Adjusted     Progression Towards Functional goals:  [] Patient is progressing as expected towards functional goals listed. [] Progression is slowed due to complexities listed. [] Progression has been slowed due to co-morbidities. [x] Plan just implemented, too soon to assess goals progression  [] Other:     ASSESSMENT:  See eval    Return to Play: (if applicable)   []  Stage 1: Intro to Strength   []  Stage 2: Dynamic Strength and Intro to Plyometrics   []  Stage 3: Advanced Plyometrics and Intro to Throwing   []  Stage 4: Sport specific Training/Return to Sport     []  Ready to Return to Play, [employer]   []  Not Ready for Return to Sports   Comments:      Treatment/Activity Tolerance:  [x] Patient tolerated treatment well [] Patient limited by fatique  [] Patient limited by pain  [] Patient limited by other medical complications  [] Other:     Overall Progression Towards Functional goals/ Treatment Progress Update:  [] Patient is progressing as expected towards functional goals listed. [] Progression is slowed due to complexities/Impairments listed. [] Progression has been slowed due to co-morbidities.   [x] Plan just implemented, too soon to assess goals progression <30days   [] Goals require adjustment due to lack of progress  [] Patient is not progressing as expected and requires additional follow up with physician  [] Other    Prognosis for POC: [x] Good [] Fair  [] Poor    Patient requires continued skilled intervention: [x] Yes  [] No      PLAN: See eval  [] Continue per plan of care [] Alter current plan (see comments)  [x] Plan of care initiated [] Hold pending MD visit [] Discharge    Electronically signed by: Paula Be PT     Note: If patient does not return for scheduled/recommended follow up visits, this note will serve as a discharge from care along with the most recent update on progress.

## 2021-06-08 ENCOUNTER — HOSPITAL ENCOUNTER (OUTPATIENT)
Dept: PHYSICAL THERAPY | Age: 51
Setting detail: THERAPIES SERIES
Discharge: HOME | End: 2021-06-08
Payer: COMMERCIAL

## 2021-06-08 PROCEDURE — 97140 MANUAL THERAPY 1/> REGIONS: CPT

## 2021-06-08 PROCEDURE — 97110 THERAPEUTIC EXERCISES: CPT

## 2021-06-08 NOTE — FLOWSHEET NOTE
[address] [place] [address]  and [facility], [place]    Physical Therapy Treatment Note/ Progress Report:     Date:  2021    Patient Name:  Amberly Barone    :  1970  MRN: [id number]  Medical/Treatment Diagnosis Information:  · Diagnosis: M47.22 Spondylosis with radiculopathy, cervical region  · Treatment Diagnosis: M47.22 Spondylosis with radiculopathy, cervical region  Insurance/Certification information:  PT Insurance Information: [place] - 61 PT visits; Massage therapy not covered  Physician Information:  Referring Practitioner: Sonia Fitch of care signed (Y/N):     Date of Patient follow up with Physician:      Progress Report: []  Yes  [x]  No     Functional Scale: 52% disability - NDI ()   Date: 21    Date Range for reporting period:  Beginnin21  Endin days or 10 visits    Progress report due (10 Rx/or 30 days whichever is less):      Recertification due (POC duration/ or 90 days whichever is less): 21     Visit # Insurance Allowable Auth Needed   2 [place] - 61 PT visits []Yes    [x]No     Pain level:  1/10 at start of session; 6/10 at worst     SUBJECTIVE:  +HEP compliance. HEP performance consistently causing mild nausea. Nausea usually only lasts no more than 10 minutes and then resolves on its own. OBJECTIVE: See eval   Observation:    Test measurements:      RESTRICTIONS/PRECAUTIONS:     Exercises/Interventions:   Therapeutic Exercise  Min:  20 Wt / Resistance Sets/sec Reps Notes   Sidelying open books  1 10 Bilateral   Prone cervical extensions  5s 15 Cues to initiate from CT junction   Seated thoracic extensions over chair            10s 10    Supine cervical retractions  5s 10    Supine pec stretch   3 min    Doorway pec stretch  30s 5           Patient education. 5 min Updated HEP.                                                 Therapeutic Activities  Min:  0 Manual Intervention  Min:  25       CT junction mobs Supine & prone   Gr. III-IV   Thoracic  and UPAs Prone   Gr. III-IV   Cervical  and UPAs Supine   Gr. III-IV   Cervical lateral glides & upglides supine   Gr. III-IV                        NMR Re-education  Min:  0                                                                   Therapeutic Exercise and NMR EXR  [x] (17104) Provided verbal/tactile cueing for activities related to strengthening, flexibility, endurance, ROM  for improvements in scapular, scapulothoracic and UE control with self care, reaching, carrying, lifting, house/yardwork, driving/computer work. [x] (87387) Provided verbal/tactile cueing for activities related to improving balance, coordination, kinesthetic sense, posture, motor skill, proprioception  to assist with  scapular, scapulothoracic and UE control with self care, reaching, carrying, lifting, house/yardwork, driving/computer work. Therapeutic Activities:    [x] (81925 or 65343) Provided verbal/tactile cueing for activities related to improving balance, coordination, kinesthetic sense, posture, motor skill, proprioception and motor activation to allow for proper function of scapular, scapulothoracic and UE control with self care, carrying, lifting, driving/computer work.      Home Exercise Program:    [x] (87039) Reviewed/Progressed HEP activities related to strengthening, flexibility, endurance, ROM of scapular, scapulothoracic and UE control with self care, reaching, carrying, lifting, house/yardwork, driving/computer work  [x] (05407) Reviewed/Progressed HEP activities related to improving balance, coordination, kinesthetic sense, posture, motor skill, proprioception of scapular, scapulothoracic and UE control with self care, reaching, carrying, lifting, house/yardwork, driving/computer work      Manual Treatments:  PROM / STM / Oscillations-Mobs:  G-I, II, III, IV (PA's, Inf., Post.)  [x] (55025) Provided manual therapy to mobilize soft tissue/joints of cervical/CT, scapular GHJ and UE for the purpose of modulating pain, promoting relaxation,  increasing ROM, reducing/eliminating soft tissue swelling/inflammation/restriction, improving soft tissue extensibility and allowing for proper ROM for normal function with self care, reaching, carrying, lifting, house/yardwork, driving/computer work    Modalities:      Charges:  Timed Code Treatment Minutes: 45   Total Treatment Minutes: 45       [] EVAL (LOW) 32197 (typically 20 minutes face-to-face)  [] EVAL (MOD) 06378 (typically 30 minutes face-to-face)  [] EVAL (HIGH) 19862 (typically 45 minutes face-to-face)  [] RE-EVAL     [x] CH(25923) x 1    [] IONTO (72266)  [] NMR (62702) x     [] VASO (65457)  [x] Manual (20292) x 2    [] Other:  [] TA (81148)x     [] Mech Traction (33260)  [] ES(attended) (56077)     [] ES (un) (07041):       GOALS:  Patient stated goal: Less pain for improved sleep. Be able to golf regularly without difficulty. [x] Progressing: [] Met: [] Not Met: [] Adjusted    Therapist goals for Patient:   Short Term Goals: To be achieved in: 2 weeks  1. Independent in HEP and progression per patient tolerance, in order to prevent re-injury. [x] Progressing: [] Met: [] Not Met: [] Adjusted  2. Patient will have a decrease in pain to facilitate improvement in movement, function, and ADLs as indicated by Functional Deficits. [x] Progressing: [] Met: [] Not Met: [] Adjusted    Long Term Goals: To be achieved in: 6 weeks  1. Disability index score of 20% or less for the NDI to assist with reaching prior level of function. [x] Progressing: [] Met: [] Not Met: [] Adjusted  2. Patient will be able to sit for >2 hours at work without increased symptoms or restriction. [x] Progressing: [] Met: [] Not Met: [] Adjusted  3. Patient will be able to look into his blind spots while driving without increased symptoms or restriction.   [x] Progressing: [] Met: [] Not Met: [] lack of progress  [] Patient is not progressing as expected and requires additional follow up with physician  [] Other    Prognosis for POC: [x] Good [] Fair  [] Poor    Patient requires continued skilled intervention: [x] Yes  [] No      PLAN: See eval  [] Continue per plan of care [] Alter current plan (see comments)  [x] Plan of care initiated [] Hold pending MD visit [] Discharge    Electronically signed by: Rosa Lai, PT, DPT    Note: If patient does not return for scheduled/recommended follow up visits, this note will serve as a discharge from care along with the most recent update on progress.

## 2021-06-10 ENCOUNTER — HOSPITAL ENCOUNTER (OUTPATIENT)
Dept: PHYSICAL THERAPY | Age: 51
Setting detail: THERAPIES SERIES
Discharge: HOME | End: 2021-06-10
Payer: COMMERCIAL

## 2021-06-10 PROCEDURE — 97140 MANUAL THERAPY 1/> REGIONS: CPT

## 2021-06-10 PROCEDURE — 97110 THERAPEUTIC EXERCISES: CPT

## 2021-06-10 NOTE — FLOWSHEET NOTE
[address] [place] [address] [facility], [place]    Physical Therapy Treatment Note/ Progress Report:     Date:  6/10/2021    Patient Name:  Rozina Hines    :  1970  MRN: [id number]  Medical/Treatment Diagnosis Information:  · Diagnosis: M47.22 Spondylosis with radiculopathy, cervical region  · Treatment Diagnosis: M47.22 Spondylosis with radiculopathy, cervical region  Insurance/Certification information:  PT Insurance Information: [place] - 61 PT visits; Massage therapy not covered  Physician Information:  Referring Practitioner: Kvng Hauser of care signed (Y/N):     Date of Patient follow up with Physician:      Progress Report: []  Yes  [x]  No     Functional Scale: 52% disability - NDI ()   Date: 21    Date Range for reporting period:  Beginnin21  Endin days or 10 visits    Progress report due (10 Rx/or 30 days whichever is less): 27     Recertification due (POC duration/ or 90 days whichever is less): 21     Visit # Insurance Allowable Auth Needed   3 [place] - 61 PT visits []Yes    [x]No     Pain level:  1/10 at start of session; 6/10 at worst     SUBJECTIVE:  Felt good after previous session. Feels like neck pain sxs and L-sided neck tightness have been better. His sleep quality seems to be a bit better as well.      OBJECTIVE: See eval   Observation:    Test measurements:      RESTRICTIONS/PRECAUTIONS:     Exercises/Interventions:   Therapeutic Exercise  Min:  20 Wt / Resistance Sets/sec Reps Notes   Sidelying open books  1 10 Bilateral   Prone cervical extensions  5s 15 Cues to initiate from CT junction   Seated thoracic extensions over chair            10s 10    Supine cervical retractions  5s 10    Supine pec stretch   3 min    Doorway pec stretch  30s 5                                                               Therapeutic Activities  Min:  0                                                                      Manual Intervention  Min:  25       CT junction mobs Supine & prone   Gr. III-IV   Thoracic  and UPAs Prone   Gr. III-IV   Cervical  and UPAs Supine   Gr. III-IV   Cervical lateral glides & upglides supine   Gr. III-IV   UT/LS PIRS supine                    NMR Re-education  Min:  0                                                                   Therapeutic Exercise and NMR EXR  [x] (94534) Provided verbal/tactile cueing for activities related to strengthening, flexibility, endurance, ROM  for improvements in scapular, scapulothoracic and UE control with self care, reaching, carrying, lifting, house/yardwork, driving/computer work. [x] (86027) Provided verbal/tactile cueing for activities related to improving balance, coordination, kinesthetic sense, posture, motor skill, proprioception  to assist with  scapular, scapulothoracic and UE control with self care, reaching, carrying, lifting, house/yardwork, driving/computer work. Therapeutic Activities:    [x] (66856 or 64877) Provided verbal/tactile cueing for activities related to improving balance, coordination, kinesthetic sense, posture, motor skill, proprioception and motor activation to allow for proper function of scapular, scapulothoracic and UE control with self care, carrying, lifting, driving/computer work.      Home Exercise Program:    [x] (94866) Reviewed/Progressed HEP activities related to strengthening, flexibility, endurance, ROM of scapular, scapulothoracic and UE control with self care, reaching, carrying, lifting, house/yardwork, driving/computer work  [x] (42701) Reviewed/Progressed HEP activities related to improving balance, coordination, kinesthetic sense, posture, motor skill, proprioception of scapular, scapulothoracic and UE control with self care, reaching, carrying, lifting, house/yardwork, driving/computer work      Manual Treatments:  PROM / STM / Oscillations-Mobs:  G-I, II, III, IV (PA's, Inf., Post.)  [x] (05272) Provided manual therapy to mobilize soft tissue/joints of cervical/CT, scapular GHJ and UE for the purpose of modulating pain, promoting relaxation,  increasing ROM, reducing/eliminating soft tissue swelling/inflammation/restriction, improving soft tissue extensibility and allowing for proper ROM for normal function with self care, reaching, carrying, lifting, house/yardwork, driving/computer work    Modalities:      Charges:  Timed Code Treatment Minutes: 45   Total Treatment Minutes: 45       [] EVAL (LOW) 60082 (typically 20 minutes face-to-face)  [] EVAL (MOD) 53885 (typically 30 minutes face-to-face)  [] EVAL (HIGH) 05431 (typically 45 minutes face-to-face)  [] RE-EVAL     [x] TV(21094) x 1    [] IONTO (01433)  [] NMR (21633) x     [] VASO (94883)  [x] Manual (98359) x 2    [] Other:  [] TA (86772)x     [] Mech Traction (20115)  [] ES(attended) (01345)     [] ES (un) (21156):       GOALS:  Patient stated goal: Less pain for improved sleep. Be able to golf regularly without difficulty. [x] Progressing: [] Met: [] Not Met: [] Adjusted    Therapist goals for Patient:   Short Term Goals: To be achieved in: 2 weeks  1. Independent in HEP and progression per patient tolerance, in order to prevent re-injury. [x] Progressing: [] Met: [] Not Met: [] Adjusted  2. Patient will have a decrease in pain to facilitate improvement in movement, function, and ADLs as indicated by Functional Deficits. [x] Progressing: [] Met: [] Not Met: [] Adjusted    Long Term Goals: To be achieved in: 6 weeks  1. Disability index score of 20% or less for the NDI to assist with reaching prior level of function. [x] Progressing: [] Met: [] Not Met: [] Adjusted  2. Patient will be able to sit for >2 hours at work without increased symptoms or restriction. [x] Progressing: [] Met: [] Not Met: [] Adjusted  3. Patient will be able to look into his blind spots while driving without increased symptoms or restriction.   [x] Progressing: [] Met: [] Not Met: [] Adjusted   4. Patient will be able to sleep in any position of choice without increased symptoms or restriction. [x] Progressing: [] Met: [] Not Met: [] Adjusted   5. Patient will be able to golf without increased symptoms or restriction. [x] Progressing: [] Met: [] Not Met: [] Adjusted     Progression Towards Functional goals:  [x] Patient is progressing as expected towards functional goals listed. [] Progression is slowed due to complexities listed. [] Progression has been slowed due to co-morbidities. [] Plan just implemented, too soon to assess goals progression  [] Other:     ASSESSMENT:  Patient reports positive response in neck pain sxs and tightness following previous session. Continued manual techniques and self stretching to address cervical and thoracic joint mobility and pec major/minor flexibility restrictions. Return to Play: (if applicable)   []  Stage 1: Intro to Strength   []  Stage 2: Dynamic Strength and Intro to Plyometrics   []  Stage 3: Advanced Plyometrics and Intro to Throwing   []  Stage 4: Sport specific Training/Return to Sport     []  Ready to Return to Play, [employer]   []  Not Ready for Return to Sports   Comments:      Treatment/Activity Tolerance:  [x] Patient tolerated treatment well [] Patient limited by fatique  [] Patient limited by pain  [] Patient limited by other medical complications  [] Other:     Overall Progression Towards Functional goals/ Treatment Progress Update:  [x] Patient is progressing as expected towards functional goals listed. [] Progression is slowed due to complexities/Impairments listed. [] Progression has been slowed due to co-morbidities.   [] Plan just implemented, too soon to assess goals progression <30days   [] Goals require adjustment due to lack of progress  [] Patient is not progressing as expected and requires additional follow up with physician  [] Other    Prognosis for POC: [x] Good [] Fair  [] Poor    Patient requires continued skilled intervention: [x] Yes  [] No      PLAN: See eval  [x] Continue per plan of care [] Alter current plan (see comments)  [] Plan of care initiated [] Hold pending MD visit [] Discharge    Electronically signed by: Batool Stanley, PT, DPT    Note: If patient does not return for scheduled/recommended follow up visits, this note will serve as a discharge from care along with the most recent update on progress.

## 2021-06-17 ENCOUNTER — HOSPITAL ENCOUNTER (OUTPATIENT)
Dept: PHYSICAL THERAPY | Age: 51
Setting detail: THERAPIES SERIES
Discharge: HOME | End: 2021-06-17
Payer: COMMERCIAL

## 2021-06-17 PROCEDURE — 97110 THERAPEUTIC EXERCISES: CPT

## 2021-06-17 PROCEDURE — 97140 MANUAL THERAPY 1/> REGIONS: CPT

## 2021-06-17 NOTE — FLOWSHEET NOTE
[address] [facility], [place]    Physical Therapy Treatment Note/ Progress Report:     Date:  2021    Patient Name:  Isac Esquivel    :  1970  MRN: [id number]  Medical/Treatment Diagnosis Information:  · Diagnosis: M47.22 Spondylosis with radiculopathy, cervical region  · Treatment Diagnosis: M47.22 Spondylosis with radiculopathy, cervical region  Insurance/Certification information:  PT Insurance Information: [place] - 61 PT visits; Massage therapy not covered  Physician Information:  Referring Practitioner: Carl De Luna of care signed (Y/N):     Date of Patient follow up with Physician:      Progress Report: []  Yes  [x]  No     Functional Scale: 52% disability - NDI ()   Date: 21    Date Range for reporting period:  Beginnin21  Endin days or 10 visits    Progress report due (10 Rx/or 30 days whichever is less):      Recertification due (POC duration/ or 90 days whichever is less): 21     Visit # Insurance Allowable Auth Needed   4 [place] - 61 PT visits []Yes    [x]No     Pain level:  0/10 at start of session; 6/10 at worst     SUBJECTIVE:  Patient was able to push mow his yard this week without any onset of tingling or numbness in either UE for the first time in what feels like years. 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 Has even noticed that as his mobility has improved in his neck and upper back, his low back pain has even gotten better, less frequent or intense.     OBJECTIVE: See eval    Observation:    Test measurements:      RESTRICTIONS/PRECAUTIONS:     Exercises/Interventions:   Therapeutic Exercise  Min:  25 Wt / Resistance Sets/sec Reps Notes   Sidelying open books  1 10 Bilateral   Prone cervical extensions  5s 15 Cues to initiate from CT junction   Seated thoracic extensions over chair            10s 10    Supine cervical retractions  5s 10    Supine pec stretch   3 min    Doorway pec stretch  30s 5    Quadruped protractions/retractions  1 10    Cable column rows lvl 5 2 15 Bilateral   Cable column extensions lvl 5 2 15 Bilateral   Cable column lat pulldowns - seated lvl 7 2 15 Bilateral                                         Therapeutic Activities  Min:  0                                                                      Manual Intervention  Min:  20       CT junction mobs Supine & prone   Gr. III-IV   Thoracic  and UPAs Prone   Gr. III-IV   Cervical  and UPAs Supine   Gr. III-IV; Deferred due to onset of nausea   Cervical lateral glides & upglides supine   Gr. III-IV; Deferred due to onset of nausea   UT/LS PIRS supine                    NMR Re-education  Min:  0                                                                   Therapeutic Exercise and NMR EXR  [x] (79141) Provided verbal/tactile cueing for activities related to strengthening, flexibility, endurance, ROM  for improvements in scapular, scapulothoracic and UE control with self care, reaching, carrying, lifting, house/yardwork, driving/computer work. [x] (82733) Provided verbal/tactile cueing for activities related to improving balance, coordination, kinesthetic sense, posture, motor skill, proprioception  to assist with  scapular, scapulothoracic and UE control with self care, reaching, carrying, lifting, house/yardwork, driving/computer work. Therapeutic Activities:    [x] (06326 or 48898) Provided verbal/tactile cueing for activities related to improving balance, coordination, kinesthetic sense, posture, motor skill, proprioception and motor activation to allow for proper function of scapular, scapulothoracic and UE control with self care, carrying, lifting, driving/computer work.      Home Exercise Program:    [x] (59752) Reviewed/Progressed HEP activities related to strengthening, flexibility, endurance, ROM of scapular, scapulothoracic and UE and Intro to Throwing   []  Stage 4: Sport specific Training/Return to Sport     []  Ready to Return to Play, [employer]   []  Not Ready for Return to Sports   Comments:      Treatment/Activity Tolerance:  [x] Patient tolerated treatment well [] Patient limited by fatique  [] Patient limited by pain  [] Patient limited by other medical complications  [] Other:     Overall Progression Towards Functional goals/ Treatment Progress Update:  [x] Patient is progressing as expected towards functional goals listed. [] Progression is slowed due to complexities/Impairments listed. [] Progression has been slowed due to co-morbidities. [] Plan just implemented, too soon to assess goals progression <30days   [] Goals require adjustment due to lack of progress  [] Patient is not progressing as expected and requires additional follow up with physician  [] Other    Prognosis for POC: [x] Good [] Fair  [] Poor    Patient requires continued skilled intervention: [x] Yes  [] No      PLAN: See eval  [x] Continue per plan of care [] Alter current plan (see comments)  [] Plan of care initiated [] Hold pending MD visit [] Discharge    Electronically signed by: Paula Be, PT, DPT    Note: If patient does not return for scheduled/recommended follow up visits, this note will serve as a discharge from care along with the most recent update on progress.

## 2021-07-15 ENCOUNTER — HOSPITAL ENCOUNTER (OUTPATIENT)
Dept: PHYSICAL THERAPY | Age: 51
Setting detail: THERAPIES SERIES
Discharge: HOME | End: 2021-07-15
Payer: COMMERCIAL

## 2021-07-15 PROCEDURE — 97110 THERAPEUTIC EXERCISES: CPT

## 2021-07-15 PROCEDURE — 97161 PT EVAL LOW COMPLEX 20 MIN: CPT

## 2021-07-15 PROCEDURE — 97140 MANUAL THERAPY 1/> REGIONS: CPT

## 2021-07-15 NOTE — FLOWSHEET NOTE
flexibility, endurance, ROM  for improvements in proximal hip and core control with self care, mobility, lifting and ambulation. [x] (55908) Provided verbal/tactile cueing for activities related to improving balance, coordination, kinesthetic sense, posture, motor skill, proprioception  to assist with core control in self care, mobility, lifting, and ambulation. Therapeutic Activities:    [x] (69972 or 56818) Provided verbal/tactile cueing for activities related to improving balance, coordination, kinesthetic sense, posture, motor skill, proprioception and motor activation to allow for proper function  with self care and ADLs  [x] (29557) Provided training and instruction to the patient for proper core and proximal hip recruitment and positioning with ambulation re-education     Home Exercise Program:    [x] (35302) Reviewed/Progressed HEP activities related to strengthening, flexibility, endurance, ROM of core, proximal hip and LE for functional self-care, mobility, lifting and ambulation   [x] (76884) Reviewed/Progressed HEP activities related to improving balance, coordination, kinesthetic sense, posture, motor skill, proprioception of core, proximal hip and LE for self care, mobility, lifting, and ambulation      Manual Treatments:  PROM / STM / Oscillations-Mobs:  G-I, II, III, IV (PA's, Inf., Post.)  [x] (32553) Provided manual therapy to mobilize proximal hip and LS spine soft tissue/joints for the purpose of modulating pain, promoting relaxation,  increasing ROM, reducing/eliminating soft tissue swelling/inflammation/restriction, improving soft tissue extensibility and allowing for proper ROM for normal function with self care, mobility, lifting and ambulation.      Modalities:       Charges:  Timed Code Treatment Minutes: 25   Total Treatment Minutes: 45       [x] EVAL (LOW) 71597 (typically 20 minutes face-to-face)  [] EVAL (MOD) 48222 (typically 30 minutes face-to-face)  [] EVAL (HIGH) 04227 (typically 45 minutes face-to-face)  [] RE-EVAL     [x] WR(25478) x 1    [] IONTO (84281)  [] NMR (72435) x     [] VASO (58180)  [x] Manual (82061) x 1    [] Other:  [] TA (25187)x     [] Mech Traction (07882)  [] ES(attended) (02236)     [] ES (un) (10479):     GOALS:  Patient stated goal:  Be able to golf pain free. [x] Progressing: [] Met: [] Not Met: [] Adjusted    Therapist goals for Patient:   Short Term Goals: To be achieved in: 2 weeks  1. Independent in HEP and progression per patient tolerance, in order to prevent re-injury. [x] Progressing: [] Met: [] Not Met: [] Adjusted  2. Patient will have a decrease in pain to facilitate improvement in movement, function, and ADLs as indicated by Functional Deficits. [x] Progressing: [] Met: [] Not Met: [] Adjusted    Long Term Goals: To be achieved in: 6 weeks  1. Disability index score of 10% or less for the MALINDA to assist with reaching prior level of function. [x] Progressing: [] Met: [] Not Met: [] Adjusted  2. Patient will demonstrate increased AROM to WNL, good LS mobility, good hip ROM to allow for proper joint functioning as indicated by patients Functional Deficits. [x] Progressing: [] Met: [] Not Met: [] Adjusted  3. Patient will be able to forward bend to pick a light object off the floor without increased symptoms or restriction. [x] Progressing: [] Met: [] Not Met: [] Adjusted  4. Patient will be able to walk >30 minutes on even and uneven surfaces including inclines/declines without increased symptoms or restriction. [x] Progressing: [] Met: [] Not Met: [] Adjusted  5. Patient will be able to tolerate prolonged sitting >2 hours at work or in the car for travel without increased symptoms or restriction. [x] Progressing: [] Met: [] Not Met: [] Adjusted    Progression Towards Functional goals:  [] Patient is progressing as expected towards functional goals listed. [] Progression is slowed due to complexities listed.   [] Progression has been slowed due to co-morbidities. [x] Plan just implemented, too soon to assess goals progression  [] Other:     ASSESSMENT:  See eval    Treatment/Activity Tolerance:  [x] Patient tolerated treatment well [] Patient limited by fatique  [] Patient limited by pain  [] Patient limited by other medical complications  [] Other:     Overall Progression Towards Functional goals/ Treatment Progress Update:  [] Patient is progressing as expected towards functional goals listed. [] Progression is slowed due to complexities/Impairments listed. [] Progression has been slowed due to co-morbidities. [x] Plan just implemented, too soon to assess goals progression <30days   [] Goals require adjustment due to lack of progress  [] Patient is not progressing as expected and requires additional follow up with physician  [] Other:    Prognosis for POC: [x] Good [] Fair  [] Poor    Patient requires continued skilled intervention: [x] Yes  [] No        PLAN: See eval  [] Continue per plan of care [] Alter current plan (see comments)  [x] Plan of care initiated [] Hold pending MD visit [] Discharge    Electronically signed by: Cristy Prater, PT, DPT, MS, SCS    Note: If patient does not return for scheduled/recommended follow up visits, this note will serve as a discharge from care along with the most recent update on progress.

## 2021-07-15 NOTE — PLAN OF CARE
Juvencio Penaloza Dr and Toribio Rojas    Dear Dr. Luciano Quick,    We had the pleasure of evaluating the following patient for physical therapy services at [address] [place] [address]. A summary of our findings can be found in the initial assessment below. This includes our plan of care. If you have any questions or concerns regarding these findings, please do not hesitate to contact me at the office phone number checked above. Thank you for the referral.       Physician Signature:_______________________________Date:__________________  By signing above (or electronic signature), therapists plan is approved by physician      Patient: Pernell Gilford   : 1970   MRN: [id number]  Referring Physician:   Luciano Quick     Evaluation Date: 7/15/2021      Medical Diagnosis Information:   M54.5 Low back pain  M54.5 Low back pain         Insurance information:  [facility] - 60 PT visits; Massage therapy not covered. Precautions/ Contra-indications: Cyst removed in face in . [place] teeth in . C-SSRS Triggered by Intake questionnaire (Past 2 wk assessment):   [x] No, Questionnaire did not trigger screening.   [] Yes, Patient intake triggered further evaluation      [] C-SSRS Screening completed  [] PCP notified via Plan of Care  [] Emergency services notified     Latex Allergy:  [x]NO      []YES  Preferred Language for Healthcare:   [x]English       []Other:    SUBJECTIVE: Patient stated complaint:  Patient's low back pain had been doing pretty well after first initiating POC for his neck and upper back sxs, however, over the past week or so, patient's low back pain worsened to the point where patient was having significant pain and difficulty taking a deep breath. Pain is a constant, dull ache and pressure in the central low back on either side.  Denies any N/T or radiating sxs into the buttock or bilateral LEs. Relevant Medical History:  Cyst removed in face in 1990. [place] teeth in 1989. Functional Disability Index/G-Codes: 44% disability - MALINDA (22/50)    Pain Scale: 1-2/10 at start of session. 7-8/10 at worst. 1-2/10 at best the last 3-4 days. Easing factors: Stretching:  Seated forward bend. SKTC. Thoracic extension. Provocative factors: Taking a deep breath. Forward bending. Prolonged walking >15-20 minutes. Prolonged sitting at work. Golf (patient has avoided to prevent pain, not sure if it would make sxs worse). Type: [x]Constant   []Intermittent  []Radiating [x]Localized []other:     Numbness/Tingling: Denies N/T. Occupation/School: Patient is an . Patient primarily sits at a desk. He is currently working from home. Living Status/Prior Level of Function: Independent with ADLs and IADLs. Patient loves playing golf. Before the disc herniation, patient could play an unlimited amount of golf. Currently, he can only hit 10-15 golf balls and he is done for several months at a time. Patient also enjoys playing handball. He can tolerate playing twice a week without really irritating neck pain sxs. OBJECTIVE:     ROM     Lumbar Flexion 90 Pulling in posterior thighs. Lumbar Extension 30 Sharp, pinch starting at 20 deg. Goes back to baseline pain levels upon returning back to standing. LEFT RIGHT   Lumbar sidebend Stretch in L lumbar spine. No pain. Decreased R SBing. P! In L lumbar spine. Lumbar Quadrant     Thoracic Rotation WNL Mildly restricted to the R. No discomfort. Just stretch in low back. LEFT RIGHT   HIP Flex TBA TBA   HIP Abd TBA TBA   HIP ER TBA TBA   HIP IR TBA TBA   Knee Flex     Knee ext     Hamstring length Moderately restricted. Moderately restricted.    Piriformis length     Lewis Test          Strength  LEFT RIGHT   ALL NORMAL []      MfA     TrA     HIP Flexors (L1-2)     HIP Abductors     HIP extension     Knee EXT (L3)     Knee Flex (S1)     Ankle DF (L4)     Ankle PF (S2)     Great Toe Ext (L5)         Reflexes  Normal Abnormal Comments   ALL NORMAL []       S1-2 Seated achilles [] []    S1-2 Prone knee bend [] []    L3-4 Patellar tendon [] []    C5-6 Biceps [] []    C6 Brachioradialis [] []    C7-8 Triceps [] []      Sensation:    [x]Dermatomes:   [] Normal   [] Abnormal     Balance:     Joint mobility:  L3, L4, L5  -- L3 CPA reproduces mild pain; L4, L5 UPAs - R L4 UPA reproduces mild pain   []Normal    [x]Hypo   []Hyper    Palpation:     Functional Mobility/Transfers:     Posture: Increased kyphosis in lower thoracic spine; decreased lumbar lordosis; decreased gluteal muscle bulk and tone bilaterally. Bandages/Dressings/Incisions:  NA    Gait: WNL      Neural dynamic tension testing Normal Abnormal Comments   Slump Test      SLR       Femoral nerve (L2-4)        Orthopedic Special Tests:    Normal Abnormal N/A Comments   Toe walk         Heel Walk       Fwd Bend-aberrant or innominate mvmt)       Trendelenburg       Kemps/Quadrant       Stork       OJ/Raul       Hip scour       ASLR       Crossed SLR       Supine to sit       Thigh thrust       SI distraction/compression       PA/Spring       Prone Instability test       Prone knee bend       Sacral Spring/thrust                                     [x] Patient history, allergies, meds reviewed. Medical chart reviewed. See intake form. Review Of Systems (ROS):  [x]Performed Review of systems (Integumentary, CardioPulmonary, Neurological) by intake and observation. Intake form has been scanned into medical record. Patient has been instructed to contact their primary care physician regarding ROS issues if not already being addressed at this time.       Co-morbidities/Complexities (which will affect course of rehabilitation):   []None           Arthritic conditions   []Rheumatoid arthritis (M05.9)  []Osteoarthritis (M19.91)   Cardiovascular conditions   []Hypertension (I10)  []Hyperlipidemia (E78.5)  []Angina pectoris (I20)  []Atherosclerosis (I70)   Musculoskeletal conditions   []Disc pathology   []Congenital spine pathologies   []Prior surgical intervention  []Osteoporosis (M81.8)  []Osteopenia (M85.8)   Endocrine conditions   []Hypothyroid (E03.9)  []Hyperthyroid Gastrointestinal conditions   []Constipation (M60.33)   Metabolic conditions   []Morbid obesity (E66.01)  []Diabetes type 1(E10.65) or 2 (E11.65)   []Neuropathy (G60.9)     Pulmonary conditions   []Asthma (J45)  []Coughing   []COPD (J44.9)   Psychological Disorders  []Anxiety (F41.9)  []Depression (F32.9)   []Other:   [x]Other: See above. Barriers to/and or personal factors that will affect rehab potential:              [x]Age  []Sex              []Motivation/Lack of Motivation                        []Co-Morbidities              []Cognitive Function, education/learning barriers              []Environmental, home barriers              []profession/work barriers  []past PT/medical experience  [x]other: chronic nature of low back sxs  Justification: Increasing age and the chronic nature of patient's low back pain sxs indicate increased likelihood for prolonged prognosis. Falls Risk Assessment (30 days):   [x] Falls Risk assessed and no intervention required.   [] Falls Risk assessed and Patient requires intervention due to being higher risk   TUG score (>12s at risk):     [] Falls education provided, including         ASSESSMENT:   Functional Impairments:     [x]Noted lumbar/proximal hip hypomobility   []Noted lumbosacral and/or generalized hypermobility   [x]Decreased Lumbosacral/hip/LE functional ROM   [x]Decreased core/proximal hip strength and neuromuscular control    []Decreased LE functional strength    []Abnormal reflexes/sensation/myotomal/dermatomal deficits  []Reduced balance/proprioceptive control    []other:      Functional Activity Limitations (from functional questionnaire and intake)   [x]Reduced ability to tolerate prolonged functional positions   []Reduced ability or difficulty with changes of positions or transfers between positions   [x]Reduced ability to maintain good posture and demonstrate good body mechanics with sitting, bending, and lifting   []Reduced ability to sleep   [x] Reduced ability or tolerance with driving and/or computer work   [x]Reduced ability to perform lifting, reaching, carrying tasks   []Reduced ability to squat   [x]Reduced ability to forward bend    [x]Reduced ability to ambulate prolonged functional periods/distances/surfaces   []Reduced ability to ascend/descend stairs   []other:       Participation Restrictions   [x]Reduced participation in self care activities   [x]Reduced participation in home management activities   [x]Reduced participation in work activities   [x]Reduced participation in social activities. [x]Reduced participation in sport/recreation activities. Classification:   []Signs/symptoms consistent with Lumbar instability/stabilization subgroup. [x]Signs/symptoms consistent with Lumbar mobilization/manipulation subgroup, myotomes and dermatomes intact. Meets manipulation criteria. []Signs/symptoms consistent with Lumbar direction specific/centralization subgroup   []Signs/symptoms consistent with Lumbar traction subgroup     []Signs/symptoms consistent with lumbar facet dysfunction   []Signs/symptoms consistent with lumbar stenosis type dysfunction   []Signs/symptoms consistent with nerve root involvement including myotome & dermatome dysfunction   []Signs/symptoms consistent with post-surgical status including: decreased ROM, strength and function.    []signs/symptoms consistent with pathology which may benefit from Dry needling     []other:      Prognosis/Rehab Potential:      [x]Excellent   []Good    []Fair   []Poor    Tolerance of evaluation/treatment:    [x]Excellent   []Good    []Fair   []Poor     Physical Therapy Evaluation Complexity Justification  [] A history of present problem with:  [] no personal factors and/or comorbidities that impact the plan of care;  [x]1-2 personal factors and/or comorbidities that impact the plan of care  []3 personal factors and/or comorbidities that impact the plan of care  [] An examination of body systems using standardized tests and measures addressing any of the following: body structures and functions (impairments), activity limitations, and/or participation restrictions;:  [x] a total of 1-2 or more elements   [] a total of 3 or more elements   [] a total of 4 or more elements   [] A clinical presentation with:  [x] stable and/or uncomplicated characteristics   [] evolving clinical presentation with changing characteristics  [] unstable and unpredictable characteristics;   [] Clinical decision making of [x] low, [] moderate, [] high complexity using standardized patient assessment instrument and/or measurable assessment of functional outcome. [x] EVAL (LOW) 15482 (typically 30 minutes face-to-face)  [] EVAL (MOD) 85974 (typically 30 minutes face-to-face)  [] EVAL (HIGH) 01284 (typically 45 minutes face-to-face)  [] RE-EVAL     PLAN: Begin PT focusing on: LB mobs, LB core activation, proximal hip activation, and HEP. Frequency/Duration:  1 days per week for 4-6 Weeks:  Interventions:  [x]  Therapeutic exercise including: strength training, ROM, for LE, Glutes and core   [x]  NMR activation and proprioception for glutes , LE and Core   [x]  Manual therapy as indicated for Hip complex, LE and spine to include: Dry Needling/IASTM, STM, PROM, Gr I-IV mobilizations, manipulation. [x]  Modalities as needed that may include: thermal agents, E-stim, Biofeedback, US, iontophoresis as indicated  [x]  Patient education on joint protection, postural re-education, activity modification, progression of HEP.     HEP instruction: (see scanned forms)    GOALS:  Patient stated goal:  Be able to golf pain free.  [x] Progressing: [] Met: [] Not Met: [] Adjusted    Therapist goals for Patient:   Short Term Goals: To be achieved in: 2 weeks  1. Independent in HEP and progression per patient tolerance, in order to prevent re-injury. [x] Progressing: [] Met: [] Not Met: [] Adjusted  2. Patient will have a decrease in pain to facilitate improvement in movement, function, and ADLs as indicated by Functional Deficits. [x] Progressing: [] Met: [] Not Met: [] Adjusted    Long Term Goals: To be achieved in: 6 weeks  1. Disability index score of 10% or less for the MALINDA to assist with reaching prior level of function. [x] Progressing: [] Met: [] Not Met: [] Adjusted  2. Patient will demonstrate increased AROM to WNL, good LS mobility, good hip ROM to allow for proper joint functioning as indicated by patients Functional Deficits. [x] Progressing: [] Met: [] Not Met: [] Adjusted  3. Patient will be able to forward bend to pick a light object off the floor without increased symptoms or restriction. [x] Progressing: [] Met: [] Not Met: [] Adjusted  4. Patient will be able to walk >30 minutes on even and uneven surfaces including inclines/declines without increased symptoms or restriction. [x] Progressing: [] Met: [] Not Met: [] Adjusted  5. Patient will be able to tolerate prolonged sitting >2 hours at work or in the car for travel without increased symptoms or restriction.    [x] Progressing: [] Met: [] Not Met: [] Adjusted     Electronically signed by:  Anushka Rai, PT, DPT, MS, SCS

## 2021-07-20 ENCOUNTER — HOSPITAL ENCOUNTER (OUTPATIENT)
Dept: PHYSICAL THERAPY | Age: 51
Setting detail: THERAPIES SERIES
Discharge: HOME | End: 2021-07-20
Payer: COMMERCIAL

## 2021-07-20 PROCEDURE — 97140 MANUAL THERAPY 1/> REGIONS: CPT

## 2021-07-20 PROCEDURE — 97110 THERAPEUTIC EXERCISES: CPT

## 2021-07-20 NOTE — PLAN OF CARE
[address] [facility] [address], [place]      Physical Therapy Re-Certification Plan of Fay Gonzales      Dear Dr. Kalpana Jacobo,    We had the pleasure of treating the following patient for physical therapy services at [address] [place] [address]. A summary of our findings can be found in the updated assessment below. This includes our plan of care. If you have any questions or concerns regarding these findings, please do not hesitate to contact me at the office phone number checked above. Thank you for the referral.     Physician Signature:________________________________Date:__________________  By signing above (or electronic signature), therapists plan is approved by physician    Date Range Of Visits: 21 to 21  Total Visits to Date: 5  Overall Response to Treatment:   [x]Patient is responding well to treatment and improvement is noted with regards  to goals   []Patient should continue to improve in reasonable time if they continue HEP   []Patient has plateaued and is no longer responding to skilled PT intervention    []Patient is getting worse and would benefit from return to referring MD   []Patient unable to adhere to initial POC   [x]Other: See assessment below for more specific details on progress.     Physical Therapy Treatment Note/ Progress Report:     Date:  2021    Patient Name:  Dianna Jung    :  1970  MRN: [id number]  Medical/Treatment Diagnosis Information:  · Diagnosis: M47.22 Spondylosis with radiculopathy, cervical region  · Treatment Diagnosis: M47.22 Spondylosis with radiculopathy, cervical region  Insurance/Certification information:  PT Insurance Information: [place] - 61 PT visits; Massage therapy not covered  Physician Information:  Referring Practitioner: Marbella Chavis of care signed (Y/N):     Date of Patient follow up with Physician:      Progress Report: [x]  Yes  []  No     Functional Scale: 42% disability - NDI ()   Date: 21    Date Range for reporting period:  Beginnin21  Endin days or 10 visits    Progress report due (10 Rx/or 30 days whichever is less): 3/20/09     Recertification due (POC duration/ or 90 days whichever is less): 21     Visit # Insurance Allowable Auth Needed   5 [place] - 61 PT visits []Yes    [x]No     Pain level:  0/10 at start of session; 4/10 at worst     SUBJECTIVE:  Feels like he is about 75% improved since starting PT for his neck. Patient has consistently been able to push mow his yard without any onset of tingling or numbness in either UE. Also able to hit two buckets of golf balls this past weekend with no onset of tingling or numbness in either UE.    OBJECTIVE:    Observation:    Test measurements:    (21):        Cervical Flexion 50 Slight pinch in the base of the L-sided neck. Cervical Extension 60 Hinges at C5/C6. Mild discomfort at end range.     Left Right   Cervical SB 45 Stretch down R-sided neck. 45 Stretch down L-sided neck. Cervical rotation 75 Slight pinch in L-sided base of the neck. 62 Mild discomfort in R-sided base of the neck.    Thoracic rotation WNL WNL   Thoracic extension WNL WNL     UE ROM  Left Right   Shoulder Flex       Shoulder Abd       Shoulder ER       Shoulder IR       Elbow flex/ext       Wrist flex/ext/pro/sup       Finger flex/ext/opposition       Shoulder AROM WNL w OP WNL WNL   UE Strength  Left Right   Shoulder Flex 4+/5 4+/5   Shoulder Scap       Shoulder ABD (C5) 4+/5  4+/5   Shoulder ER        Shoulder IR       Elbow Flex (C5) 5/5 5/5   Elbow Ext (C7 Radial) 5/5 5/5   Wrist Flex (C6 Radial) 5/5 5/5   Wrist Ext (C7 Radial) 5/5 5/5   Finger flex (C8 median) 5/5 5/5   Finger ext (C7 Radial-PIN) 5/5 5/5   APB (T1 Median) 5/5 5/5   Finger Abd (T1 Ulnar) 5/5 5/5   UE myotomes WNL          Reflexes Normal Abnormal Comments   S1-2 Seated achilles []?  []?      S1-2 Prone knee bend []?  []?      L3-4 Patellar tendon []?  []?      C5-6 Biceps [x]?  []?      C6 Brachioradialis [x]?  []?      C7-8 Triceps [x]?  []?            Joint mobility: Cervical and thoracic  and UPAs               []?Normal                      [x]? Hypo              []?Hyper      Posture: Forward head posture in static sitting; decreased thoracic kyphosis; decreased lumbar lordosis; small dowager's hump present; anterior and internally rotated humerus bilaterally in static sitting; significant posterior transition zone at C5/C6. Neurodynamics:  Ulnar Nn Tension Test (R/L):  +/+ Stretch. Median Nn Tension Test (R/L):  +/+ Stretch  Radial Nn Tension Test (R/L):  -/-       Orthopedic Special Tests Normal Abnormal N/A Comments   Spurling Maneuver:  [x]?  []?  []?     Distraction testing: [x]?  []?  []?     ULTT [x]?  []?  []?     Rotation < 60 deg involved side [x]?  []?  []?      Shoulder Abd test []?  []?  []?      Cerv Rot/Lat Flex- 1st Rib []?  []?  []?      Deep Neck Flex/endurance testing []?  []?  []?      Craniocerv Flex testing /Cuff []?  []?  []?      End Range Tolerance testing. []?  []?  []?        []?  []?  []?      *clinical cluster for cervical radiculopathy       RESTRICTIONS/PRECAUTIONS:     Exercises/Interventions:   Therapeutic Exercise  Min:  30 Wt / Resistance Sets/sec Reps Notes   Sidelying open books  1 10 Bilateral   Prone cervical extensions with rotation  5s 15 Cues to initiate from CT junction   Seated thoracic extensions over chair            10s 10    Supine cervical retractions  5s 10    Supine pec stretch   3 min    Doorway pec stretch  30s 5    Quadruped protractions/retractions  1 10    Cable column rows lvl 5 2 15 Bilateral   Cable column extensions lvl 5 2 15 Bilateral   Cable column lat pulldowns - seated lvl 7 2 15 Bilateral   Ulnar nerve glides   15-20 Bilateral   Median nerve glides   15-20 Bilateral          Tests and measures. 5 min Progress note. Patient education.    5 min Neural mobility; what nerves need to be symptom free, what can irritate nerves; updated HEP                    Therapeutic Activities  Min:  0                                                                      Manual Intervention  Min:  20       CT junction mobs Supine & prone   Gr. III-IV   Thoracic  and UPAs Prone   Gr. III-IV   Cervical  and UPAs Supine   Gr. III-IV   Cervical lateral glides & upglides supine   Gr. III-IV   UT/LS PIRS supine                    NMR Re-education  Min:  0                                                                   Therapeutic Exercise and NMR EXR  [x] (26672) Provided verbal/tactile cueing for activities related to strengthening, flexibility, endurance, ROM  for improvements in scapular, scapulothoracic and UE control with self care, reaching, carrying, lifting, house/yardwork, driving/computer work. [x] (48872) Provided verbal/tactile cueing for activities related to improving balance, coordination, kinesthetic sense, posture, motor skill, proprioception  to assist with  scapular, scapulothoracic and UE control with self care, reaching, carrying, lifting, house/yardwork, driving/computer work. Therapeutic Activities:    [x] (15923 or 57314) Provided verbal/tactile cueing for activities related to improving balance, coordination, kinesthetic sense, posture, motor skill, proprioception and motor activation to allow for proper function of scapular, scapulothoracic and UE control with self care, carrying, lifting, driving/computer work.      Home Exercise Program:    [x] (42572) Reviewed/Progressed HEP activities related to strengthening, flexibility, endurance, ROM of scapular, scapulothoracic and UE control with self care, reaching, carrying, lifting, house/yardwork, driving/computer work  [x] (98373) Reviewed/Progressed HEP activities related to improving balance, coordination, kinesthetic sense, posture, motor skill, proprioception of scapular, scapulothoracic and UE control with self care, reaching, carrying, lifting, house/yardwork, driving/computer work      Manual Treatments:  PROM / STM / Oscillations-Mobs:  G-I, II, III, IV (PA's, Inf., Post.)  [x] (39070) Provided manual therapy to mobilize soft tissue/joints of cervical/CT, scapular GHJ and UE for the purpose of modulating pain, promoting relaxation,  increasing ROM, reducing/eliminating soft tissue swelling/inflammation/restriction, improving soft tissue extensibility and allowing for proper ROM for normal function with self care, reaching, carrying, lifting, house/yardwork, driving/computer work    Modalities:      Charges:  Timed Code Treatment Minutes: 50   Total Treatment Minutes: 50       [] EVAL (LOW) 64193 (typically 20 minutes face-to-face)  [] EVAL (MOD) 85856 (typically 30 minutes face-to-face)  [] EVAL (HIGH) 91524 (typically 45 minutes face-to-face)  [] RE-EVAL     [x] BR(89912) x 2    [] IONTO (85039)  [] NMR (04028) x     [] HDIT (14139)  [x] Manual (03543) x 1    [] Other:  [] TA (11300)x     [] Mech Traction (84306)  [] ES(attended) (84565)     [] ES (un) (56792):       GOALS:  Patient stated goal: Less pain for improved sleep. Be able to golf regularly without difficulty. [x] Progressing: [] Met: [] Not Met: [] Adjusted  Comment:  Sleep is still a major challenge. Able to hit two buckets of golf balls without onset of N/T in either UE. Therapist goals for Patient:   Short Term Goals: To be achieved in: 2 weeks  1. Independent in HEP and progression per patient tolerance, in order to prevent re-injury. [x] Progressing: [x] Met: [] Not Met: [] Adjusted  2. Patient will have a decrease in pain to facilitate improvement in movement, function, and ADLs as indicated by Functional Deficits. [] Progressing: [x] Met: [] Not Met: [] Adjusted    Long Term Goals: To be achieved in: 6 weeks  1. Disability index score of 20% or less for the NDI to assist with reaching prior level of function.    [x] Progressing: [] Met: [] Not Met: [] Adjusted Comment:  42% disability on NDI as of 7/20/21  2. Patient will be able to sit for >2 hours at work without increased symptoms or restriction. [x] Progressing: [] Met: [] Not Met: [] Adjusted    3. Patient will be able to look into his blind spots while driving without increased symptoms or restriction. [x] Progressing: [] Met: [] Not Met: [] Adjusted Comment:  Improving. Still some mild pinching at end ranges when rotating head. 4. Patient will be able to sleep in any position of choice without increased symptoms or restriction. [x] Progressing: [] Met: [] Not Met: [] Adjusted Comment:  Sleeping is still a major challenge. 5. Patient will be able to golf without increased symptoms or restriction. [x] Progressing: [] Met: [] Not Met: [] Adjusted  Comment:  Gets stiff after golfing, but no longer having any N/T onset. Progression Towards Functional goals:  [x] Patient is progressing as expected towards functional goals listed. [] Progression is slowed due to complexities listed. [] Progression has been slowed due to co-morbidities. [] Plan just implemented, too soon to assess goals progression  [] Other:     ASSESSMENT:  Patient has been seen for 5 PT visits for chronic L>R-sided neck pain and N/T occurring intermittently in bilateral UEs. Patient feels like his sxs and overall function have improved about 75% since starting physical therapy. Patient subjectively reporting improvement on NDI from 35% function to 58% function. Patient is now consistently push mowing the yard without onset of N/T in either UE. He was also able to hit two buckets of golf balls this past weekend with no onset of N/T in either UE. Patient demonstrates significant improvements in both cervical and thoracic mobility with significantly less pain reported with all ROM assessment. Does still have some mild pinching on either side with certain end range movements.  Patient still has significant challenge with sleeping, reading, and performing all of his usual recreational activities including playing recreational sports like handball. Patient will continue to benefit from skilled PT to address remaining ROM, mobility, strength, and functional deficits to enable full, safe performance of all previous activities without difficulty or pain. Return to Play: (if applicable)   []  Stage 1: Intro to Strength   []  Stage 2: Dynamic Strength and Intro to Plyometrics   []  Stage 3: Advanced Plyometrics and Intro to Throwing   []  Stage 4: Sport specific Training/Return to Sport     []  Ready to Return to Play, [employer]   []  Not Ready for Return to Sports   Comments:      Treatment/Activity Tolerance:  [x] Patient tolerated treatment well [] Patient limited by fatique  [] Patient limited by pain  [] Patient limited by other medical complications  [] Other:     Overall Progression Towards Functional goals/ Treatment Progress Update:  [x] Patient is progressing as expected towards functional goals listed. [] Progression is slowed due to complexities/Impairments listed. [] Progression has been slowed due to co-morbidities. [] Plan just implemented, too soon to assess goals progression <30days   [] Goals require adjustment due to lack of progress  [] Patient is not progressing as expected and requires additional follow up with physician  [] Other    Prognosis for POC: [x] Good [] Fair  [] Poor    Patient requires continued skilled intervention: [x] Yes  [] No      PLAN: 1x per week for 4 more weeks (7/20/21 to 8/20/21). [x] Continue per plan of care [] Alter current plan (see comments)  [] Plan of care initiated [] Hold pending MD visit [] Discharge    Electronically signed by: Kd Edmonds, PT, DPT, MS, SCS    Note: If patient does not return for scheduled/recommended follow up visits, this note will serve as a discharge from care along with the most recent update on progress.

## 2021-12-09 ENCOUNTER — OFFICE VISIT (OUTPATIENT)
Dept: FAMILY MEDICINE CLINIC | Age: 51
End: 2021-12-09
Payer: COMMERCIAL

## 2021-12-09 VITALS
BODY MASS INDEX: 31.25 KG/M2 | HEART RATE: 87 BPM | SYSTOLIC BLOOD PRESSURE: 132 MMHG | RESPIRATION RATE: 16 BRPM | WEIGHT: 223.2 LBS | DIASTOLIC BLOOD PRESSURE: 86 MMHG | OXYGEN SATURATION: 99 % | HEIGHT: 71 IN

## 2021-12-09 DIAGNOSIS — R53.82 CHRONIC FATIGUE, UNSPECIFIED: ICD-10-CM

## 2021-12-09 DIAGNOSIS — Z12.5 SCREENING PSA (PROSTATE SPECIFIC ANTIGEN): ICD-10-CM

## 2021-12-09 DIAGNOSIS — Z00.00 ROUTINE GENERAL MEDICAL EXAMINATION AT A HEALTH CARE FACILITY: Primary | ICD-10-CM

## 2021-12-09 DIAGNOSIS — Z13.220 SCREENING, LIPID: ICD-10-CM

## 2021-12-09 DIAGNOSIS — E03.9 ACQUIRED HYPOTHYROIDISM: ICD-10-CM

## 2021-12-09 DIAGNOSIS — Z12.11 SCREEN FOR COLON CANCER: ICD-10-CM

## 2021-12-09 LAB
A/G RATIO: 1.8 (ref 1.1–2.2)
ALBUMIN SERPL-MCNC: 4.4 G/DL (ref 3.4–5)
ALP BLD-CCNC: 55 U/L (ref 40–129)
ALT SERPL-CCNC: 46 U/L (ref 10–40)
ANION GAP SERPL CALCULATED.3IONS-SCNC: 16 MMOL/L (ref 3–16)
AST SERPL-CCNC: 27 U/L (ref 15–37)
BASOPHILS ABSOLUTE: 0 K/UL (ref 0–0.2)
BASOPHILS RELATIVE PERCENT: 0.8 %
BILIRUB SERPL-MCNC: 0.6 MG/DL (ref 0–1)
BUN BLDV-MCNC: 15 MG/DL (ref 7–20)
CALCIUM SERPL-MCNC: 9.1 MG/DL (ref 8.3–10.6)
CHLORIDE BLD-SCNC: 99 MMOL/L (ref 99–110)
CHOLESTEROL, TOTAL: 203 MG/DL (ref 0–199)
CO2: 24 MMOL/L (ref 21–32)
CREAT SERPL-MCNC: 1 MG/DL (ref 0.9–1.3)
EOSINOPHILS ABSOLUTE: 0.5 K/UL (ref 0–0.6)
EOSINOPHILS RELATIVE PERCENT: 9.7 %
GFR AFRICAN AMERICAN: >60
GFR NON-AFRICAN AMERICAN: >60
GLUCOSE BLD-MCNC: 100 MG/DL (ref 70–99)
HCT VFR BLD CALC: 45.1 % (ref 40.5–52.5)
HDLC SERPL-MCNC: 42 MG/DL (ref 40–60)
HEMOGLOBIN: 14.9 G/DL (ref 13.5–17.5)
LDL CHOLESTEROL CALCULATED: 132 MG/DL
LYMPHOCYTES ABSOLUTE: 1.3 K/UL (ref 1–5.1)
LYMPHOCYTES RELATIVE PERCENT: 25.7 %
MCH RBC QN AUTO: 30.5 PG (ref 26–34)
MCHC RBC AUTO-ENTMCNC: 33 G/DL (ref 31–36)
MCV RBC AUTO: 92.4 FL (ref 80–100)
MONOCYTES ABSOLUTE: 0.4 K/UL (ref 0–1.3)
MONOCYTES RELATIVE PERCENT: 7.9 %
NEUTROPHILS ABSOLUTE: 2.9 K/UL (ref 1.7–7.7)
NEUTROPHILS RELATIVE PERCENT: 55.9 %
PDW BLD-RTO: 12.9 % (ref 12.4–15.4)
PLATELET # BLD: 169 K/UL (ref 135–450)
PMV BLD AUTO: 9.1 FL (ref 5–10.5)
POTASSIUM SERPL-SCNC: 4 MMOL/L (ref 3.5–5.1)
PROSTATE SPECIFIC ANTIGEN: 0.48 NG/ML (ref 0–4)
RBC # BLD: 4.88 M/UL (ref 4.2–5.9)
SODIUM BLD-SCNC: 139 MMOL/L (ref 136–145)
T4 FREE: 1.5 NG/DL (ref 0.9–1.8)
TOTAL PROTEIN: 6.9 G/DL (ref 6.4–8.2)
TRIGL SERPL-MCNC: 144 MG/DL (ref 0–150)
TSH SERPL DL<=0.05 MIU/L-ACNC: 2.65 UIU/ML (ref 0.27–4.2)
VLDLC SERPL CALC-MCNC: 29 MG/DL
WBC # BLD: 5.2 K/UL (ref 4–11)

## 2021-12-09 PROCEDURE — 36415 COLL VENOUS BLD VENIPUNCTURE: CPT | Performed by: FAMILY MEDICINE

## 2021-12-09 PROCEDURE — 99396 PREV VISIT EST AGE 40-64: CPT | Performed by: FAMILY MEDICINE

## 2021-12-09 SDOH — ECONOMIC STABILITY: FOOD INSECURITY: WITHIN THE PAST 12 MONTHS, YOU WORRIED THAT YOUR FOOD WOULD RUN OUT BEFORE YOU GOT MONEY TO BUY MORE.: NEVER TRUE

## 2021-12-09 SDOH — ECONOMIC STABILITY: FOOD INSECURITY: WITHIN THE PAST 12 MONTHS, THE FOOD YOU BOUGHT JUST DIDN'T LAST AND YOU DIDN'T HAVE MONEY TO GET MORE.: NEVER TRUE

## 2021-12-09 ASSESSMENT — SOCIAL DETERMINANTS OF HEALTH (SDOH): HOW HARD IS IT FOR YOU TO PAY FOR THE VERY BASICS LIKE FOOD, HOUSING, MEDICAL CARE, AND HEATING?: NOT HARD AT ALL

## 2021-12-09 NOTE — PROGRESS NOTES
2021    Stanislaw Vargas (:  1970) is a 49 y.o. male, here for evaluation of the following chief complaint(s): Annual Exam      ASSESSMENT/PLAN:     Diagnosis Orders   1. Routine general medical examination at a health care facility     2. Screening PSA (prostate specific antigen)  PSA screening   3. Screening, lipid  Lipid Panel   4. Screen for colon cancer      fit cards   5. Acquired hypothyroidism  TSH without Reflex    T4, Free    labs on Levo 137   6. Chronic fatigue, unspecified  Comprehensive Metabolic Panel    CBC Auto Differential       Return in about 1 year (around 2022) for screen psa, screen lipid, Hypothyroidism. An electronic signature was used to authenticate this note.     @SIG@    SUBJECTIVE/OBJECTIVE:  (NOTE : prior results listed below reviewed at this visit to assist in medical decision making.)    HPI / ROS    # Preventive and other issues  # PSA screening history:  Lab Results   Component Value Date    PSA 0.45 10/12/2020    PSA 0.49 2019    PSA 0.46 2018     # Lipids - recent screening history:  Lab Results   Component Value Date    LDLCALC 130 (H) 10/12/2020     Lab Results   Component Value Date    TRIG 150 10/12/2020     Lab Results   Component Value Date    HDL 42 10/12/2020     Lab Results   Component Value Date    CHOL 202 (H) 10/12/2020       # screen colon cancer - screening status discussed with patient; reviewed today prior testing - none; referred        Wt Readings from Last 3 Encounters:   21 223 lb 3.2 oz (101.2 kg)   10/12/20 217 lb 12.8 oz (98.8 kg)   19 220 lb 3.2 oz (99.9 kg)       BP Readings from Last 3 Encounters:   21 132/86   10/12/20 128/72   19 129/85       PHYSICAL EXAM  Vitals:    21 0828   BP: 132/86   Site: Left Upper Arm   Position: Sitting   Cuff Size: Large Adult   Pulse: 87   Resp: 16   SpO2: 99%   Weight: 223 lb 3.2 oz (101.2 kg)   Height: 5' 11\" (1.803 m)     A&o  Neck no TMG no bruit  Car reg no MGR  Lungs cta  Ext no edema  Skin no jaundice  Eyes anicteric

## 2022-02-07 ENCOUNTER — NURSE ONLY (OUTPATIENT)
Dept: FAMILY MEDICINE CLINIC | Age: 52
End: 2022-02-07
Payer: COMMERCIAL

## 2022-02-07 ENCOUNTER — TELEPHONE (OUTPATIENT)
Dept: FAMILY MEDICINE CLINIC | Age: 52
End: 2022-02-07

## 2022-02-07 DIAGNOSIS — Z12.11 SCREEN FOR COLON CANCER: Primary | ICD-10-CM

## 2022-02-07 PROCEDURE — 82274 ASSAY TEST FOR BLOOD FECAL: CPT | Performed by: FAMILY MEDICINE

## 2022-02-10 LAB
CONTROL: POSITIVE
HEMOCCULT STL QL: NORMAL

## 2022-04-20 DIAGNOSIS — E03.9 HYPOTHYROIDISM, UNSPECIFIED TYPE: ICD-10-CM

## 2022-04-20 RX ORDER — LEVOTHYROXINE SODIUM 137 UG/1
TABLET ORAL
Qty: 90 TABLET | Refills: 3 | Status: SHIPPED | OUTPATIENT
Start: 2022-04-20

## 2022-11-29 ENCOUNTER — OFFICE VISIT (OUTPATIENT)
Dept: FAMILY MEDICINE CLINIC | Age: 52
End: 2022-11-29
Payer: COMMERCIAL

## 2022-11-29 VITALS
HEART RATE: 68 BPM | DIASTOLIC BLOOD PRESSURE: 84 MMHG | BODY MASS INDEX: 32.68 KG/M2 | WEIGHT: 233.4 LBS | OXYGEN SATURATION: 99 % | SYSTOLIC BLOOD PRESSURE: 126 MMHG | HEIGHT: 71 IN

## 2022-11-29 DIAGNOSIS — Z23 NEEDS FLU SHOT: ICD-10-CM

## 2022-11-29 DIAGNOSIS — Z13.220 SCREENING, LIPID: ICD-10-CM

## 2022-11-29 DIAGNOSIS — Z00.00 ROUTINE GENERAL MEDICAL EXAMINATION AT A HEALTH CARE FACILITY: Primary | ICD-10-CM

## 2022-11-29 DIAGNOSIS — Z23 NEED FOR DIPHTHERIA-TETANUS-PERTUSSIS (TDAP) VACCINE: ICD-10-CM

## 2022-11-29 DIAGNOSIS — Z12.5 SCREENING PSA (PROSTATE SPECIFIC ANTIGEN): ICD-10-CM

## 2022-11-29 DIAGNOSIS — E03.9 ACQUIRED HYPOTHYROIDISM: ICD-10-CM

## 2022-11-29 LAB
CHOLESTEROL, TOTAL: 199 MG/DL (ref 0–199)
HDLC SERPL-MCNC: 38 MG/DL (ref 40–60)
LDL CHOLESTEROL CALCULATED: 112 MG/DL
PROSTATE SPECIFIC ANTIGEN: 0.48 NG/ML (ref 0–4)
T4 FREE: 1.5 NG/DL (ref 0.9–1.8)
TRIGL SERPL-MCNC: 245 MG/DL (ref 0–150)
TSH SERPL DL<=0.05 MIU/L-ACNC: 3.85 UIU/ML (ref 0.27–4.2)
VLDLC SERPL CALC-MCNC: 49 MG/DL

## 2022-11-29 PROCEDURE — 90715 TDAP VACCINE 7 YRS/> IM: CPT | Performed by: FAMILY MEDICINE

## 2022-11-29 PROCEDURE — 36415 COLL VENOUS BLD VENIPUNCTURE: CPT | Performed by: FAMILY MEDICINE

## 2022-11-29 PROCEDURE — 99396 PREV VISIT EST AGE 40-64: CPT | Performed by: FAMILY MEDICINE

## 2022-11-29 PROCEDURE — 90471 IMMUNIZATION ADMIN: CPT | Performed by: FAMILY MEDICINE

## 2022-11-29 ASSESSMENT — PATIENT HEALTH QUESTIONNAIRE - PHQ9
SUM OF ALL RESPONSES TO PHQ QUESTIONS 1-9: 0
SUM OF ALL RESPONSES TO PHQ QUESTIONS 1-9: 0
2. FEELING DOWN, DEPRESSED OR HOPELESS: 0
SUM OF ALL RESPONSES TO PHQ QUESTIONS 1-9: 0
SUM OF ALL RESPONSES TO PHQ QUESTIONS 1-9: 0
SUM OF ALL RESPONSES TO PHQ9 QUESTIONS 1 & 2: 0
1. LITTLE INTEREST OR PLEASURE IN DOING THINGS: 0

## 2022-11-29 NOTE — PROGRESS NOTES
2022    Ricardo Tucker (:  1970) is a 47 y.o. male, here for evaluation of the following chief complaint(s): Annual Exam (Thyroid check)      ASSESSMENT/PLAN:     Diagnosis Orders   1. Routine general medical examination at a health care facility        2. Screening, lipid  Lipid Panel      3. Screening PSA (prostate specific antigen)  PSA Screening      4. Acquired hypothyroidism  TSH    T4, Free    labs on Levo and adjust      5. Need for diphtheria-tetanus-pertussis (Tdap) vaccine        6. Needs flu shot            Return in about 1 year (around 2023) for Well Adult, screen psa, screen lipid, screen colon, Hypothyroidism. An electronic signature was used to authenticate this note.     SUBJECTIVE/OBJECTIVE:  (NOTE : prior results listed below reviewed at this visit to assist in medical decision making.)    HPI / ROS    # Preventive and other issues  # Lipids - recent screening history:  Lab Results   Component Value Date    LDLCALC 132 (H) 2021     Lab Results   Component Value Date    TRIG 144 2021     Lab Results   Component Value Date    HDL 42 2021     Lab Results   Component Value Date    CHOL 203 (H) 2021     # PSA screening history:  Lab Results   Component Value Date    PSA 0.48 2021    PSA 0.45 10/12/2020    PSA 0.49 2019     # hypothyroidism on Levo  Lab Results   Component Value Date    TSH 2.65 2021    W9RTULA 0.83 10/24/2011    E5ZBMXZ 6.8 2010    FT3 3.4 2016    T4FREE 1.5 2021               Wt Readings from Last 3 Encounters:   22 233 lb 6.4 oz (105.9 kg)   21 223 lb 3.2 oz (101.2 kg)   10/12/20 217 lb 12.8 oz (98.8 kg)       BP Readings from Last 3 Encounters:   22 126/84   21 132/86   10/12/20 128/72       PHYSICAL EXAM  Vitals:    22 0832 22 0901   BP: (!) 135/90 126/84   Site:  Left Upper Arm   Position:  Sitting   Cuff Size:  Medium Adult   Pulse: 68    SpO2: 99% Weight: 233 lb 6.4 oz (105.9 kg)    Height: 5' 11\" (1.803 m)      A&o  Neck no TMG no bruit  Car reg no MGR  Lungs cta  Ext no edema  Skin no jaundice  Eyes anicteric

## 2023-03-10 ENCOUNTER — TELEPHONE (OUTPATIENT)
Dept: FAMILY MEDICINE CLINIC | Age: 53
End: 2023-03-10

## 2023-03-10 NOTE — TELEPHONE ENCOUNTER
Left reminder message in regards to patient being due for his colorectal screening and to give the office a call if he would like an referral placed or cologuard mailed out to him.

## 2023-05-16 ENCOUNTER — PATIENT MESSAGE (OUTPATIENT)
Dept: FAMILY MEDICINE CLINIC | Age: 53
End: 2023-05-16

## 2023-05-16 DIAGNOSIS — Z12.11 COLON CANCER SCREENING: Primary | ICD-10-CM

## 2023-05-16 NOTE — TELEPHONE ENCOUNTER
From: Heidi Baker  To: Dr. Flavia Srivastava: 5/16/2023 1:17 PM EDT  Subject: Cologuard Test    I logged into [facility] for dermatology appointment echeck in and noticed that it said a message was left for me on March 10th about colorectal screening and to mail to me a cologuard test.    I do not recall that message and didn't receive a test by mail - can you send me one or can I stop by and pick one up?     Thanks,  Jordy Umanzor

## 2023-06-20 DIAGNOSIS — R19.5 POSITIVE COLORECTAL CANCER SCREENING USING COLOGUARD TEST: ICD-10-CM

## 2023-06-20 DIAGNOSIS — Z12.11 SCREEN FOR COLON CANCER: Primary | ICD-10-CM

## 2023-06-20 LAB — NONINV COLON CA DNA+OCC BLD SCRN STL QL: POSITIVE

## 2023-06-26 ENCOUNTER — ANESTHESIA EVENT (OUTPATIENT)
Dept: ENDOSCOPY | Age: 53
End: 2023-06-26
Payer: COMMERCIAL

## 2023-06-27 ENCOUNTER — ANESTHESIA (OUTPATIENT)
Dept: ENDOSCOPY | Age: 53
End: 2023-06-27
Payer: COMMERCIAL

## 2023-06-27 ENCOUNTER — HOSPITAL ENCOUNTER (OUTPATIENT)
Age: 53
Setting detail: OUTPATIENT SURGERY
Discharge: HOME | End: 2023-06-27
Attending: INTERNAL MEDICINE | Admitting: INTERNAL MEDICINE
Payer: COMMERCIAL

## 2023-06-27 VITALS
TEMPERATURE: 97 F | OXYGEN SATURATION: 97 % | HEART RATE: 80 BPM | DIASTOLIC BLOOD PRESSURE: 88 MMHG | HEIGHT: 71 IN | BODY MASS INDEX: 30.8 KG/M2 | SYSTOLIC BLOOD PRESSURE: 131 MMHG | RESPIRATION RATE: 16 BRPM | WEIGHT: 220 LBS

## 2023-06-27 DIAGNOSIS — R19.5 POSITIVE COLORECTAL CANCER SCREENING USING COLOGUARD TEST: ICD-10-CM

## 2023-06-27 PROCEDURE — 7100000011 HC PHASE II RECOVERY - ADDTL 15 MIN: Performed by: INTERNAL MEDICINE

## 2023-06-27 PROCEDURE — 2500000003 HC RX 250 WO HCPCS: Performed by: NURSE ANESTHETIST, CERTIFIED REGISTERED

## 2023-06-27 PROCEDURE — 6360000002 HC RX W HCPCS: Performed by: NURSE ANESTHETIST, CERTIFIED REGISTERED

## 2023-06-27 PROCEDURE — 7100000010 HC PHASE II RECOVERY - FIRST 15 MIN: Performed by: INTERNAL MEDICINE

## 2023-06-27 PROCEDURE — 3700000001 HC ADD 15 MINUTES (ANESTHESIA): Performed by: INTERNAL MEDICINE

## 2023-06-27 PROCEDURE — 3700000000 HC ANESTHESIA ATTENDED CARE: Performed by: INTERNAL MEDICINE

## 2023-06-27 PROCEDURE — 2580000003 HC RX 258: Performed by: NURSE ANESTHETIST, CERTIFIED REGISTERED

## 2023-06-27 PROCEDURE — 2709999900 HC NON-CHARGEABLE SUPPLY: Performed by: INTERNAL MEDICINE

## 2023-06-27 PROCEDURE — 2580000003 HC RX 258: Performed by: ANESTHESIOLOGY

## 2023-06-27 PROCEDURE — 3609010600 HC COLONOSCOPY POLYPECTOMY SNARE/COLD BIOPSY: Performed by: INTERNAL MEDICINE

## 2023-06-27 PROCEDURE — 88305 TISSUE EXAM BY PATHOLOGIST: CPT

## 2023-06-27 RX ORDER — SODIUM CHLORIDE 0.9 % (FLUSH) 0.9 %
5-40 SYRINGE (ML) INJECTION EVERY 12 HOURS SCHEDULED
Status: DISCONTINUED | OUTPATIENT
Start: 2023-06-27 | End: 2023-06-27 | Stop reason: HOSPADM

## 2023-06-27 RX ORDER — DIPHENHYDRAMINE HYDROCHLORIDE 50 MG/ML
12.5 INJECTION INTRAMUSCULAR; INTRAVENOUS
Status: CANCELLED | OUTPATIENT
Start: 2023-06-27 | End: 2023-06-28

## 2023-06-27 RX ORDER — SODIUM CHLORIDE 0.9 % (FLUSH) 0.9 %
5-40 SYRINGE (ML) INJECTION PRN
Status: CANCELLED | OUTPATIENT
Start: 2023-06-27

## 2023-06-27 RX ORDER — SODIUM CHLORIDE 0.9 % (FLUSH) 0.9 %
5-40 SYRINGE (ML) INJECTION PRN
Status: DISCONTINUED | OUTPATIENT
Start: 2023-06-27 | End: 2023-06-27 | Stop reason: HOSPADM

## 2023-06-27 RX ORDER — PROPOFOL 10 MG/ML
INJECTION, EMULSION INTRAVENOUS PRN
Status: DISCONTINUED | OUTPATIENT
Start: 2023-06-27 | End: 2023-06-27 | Stop reason: SDUPTHER

## 2023-06-27 RX ORDER — SODIUM CHLORIDE 9 MG/ML
INJECTION, SOLUTION INTRAVENOUS PRN
Status: DISCONTINUED | OUTPATIENT
Start: 2023-06-27 | End: 2023-06-27 | Stop reason: HOSPADM

## 2023-06-27 RX ORDER — FENTANYL CITRATE 50 UG/ML
25 INJECTION, SOLUTION INTRAMUSCULAR; INTRAVENOUS EVERY 5 MIN PRN
Status: CANCELLED | OUTPATIENT
Start: 2023-06-27

## 2023-06-27 RX ORDER — LIDOCAINE HYDROCHLORIDE 20 MG/ML
INJECTION, SOLUTION EPIDURAL; INFILTRATION; INTRACAUDAL; PERINEURAL PRN
Status: DISCONTINUED | OUTPATIENT
Start: 2023-06-27 | End: 2023-06-27 | Stop reason: SDUPTHER

## 2023-06-27 RX ORDER — ONDANSETRON 2 MG/ML
4 INJECTION INTRAMUSCULAR; INTRAVENOUS
Status: CANCELLED | OUTPATIENT
Start: 2023-06-27 | End: 2023-06-28

## 2023-06-27 RX ORDER — SODIUM CHLORIDE 9 MG/ML
INJECTION, SOLUTION INTRAVENOUS PRN
Status: CANCELLED | OUTPATIENT
Start: 2023-06-27

## 2023-06-27 RX ORDER — MEPERIDINE HYDROCHLORIDE 25 MG/ML
12.5 INJECTION INTRAMUSCULAR; INTRAVENOUS; SUBCUTANEOUS EVERY 5 MIN PRN
Status: CANCELLED | OUTPATIENT
Start: 2023-06-27

## 2023-06-27 RX ORDER — LABETALOL HYDROCHLORIDE 5 MG/ML
5 INJECTION, SOLUTION INTRAVENOUS
Status: CANCELLED | OUTPATIENT
Start: 2023-06-27

## 2023-06-27 RX ORDER — SODIUM CHLORIDE 0.9 % (FLUSH) 0.9 %
5-40 SYRINGE (ML) INJECTION EVERY 12 HOURS SCHEDULED
Status: CANCELLED | OUTPATIENT
Start: 2023-06-27

## 2023-06-27 RX ORDER — SODIUM CHLORIDE 9 MG/ML
INJECTION, SOLUTION INTRAVENOUS CONTINUOUS PRN
Status: DISCONTINUED | OUTPATIENT
Start: 2023-06-27 | End: 2023-06-27 | Stop reason: SDUPTHER

## 2023-06-27 RX ADMIN — PROPOFOL 50 MG: 10 INJECTION, EMULSION INTRAVENOUS at 12:24

## 2023-06-27 RX ADMIN — LIDOCAINE HYDROCHLORIDE 40 MG: 20 INJECTION, SOLUTION EPIDURAL; INFILTRATION; INTRACAUDAL; PERINEURAL at 12:16

## 2023-06-27 RX ADMIN — PROPOFOL 50 MG: 10 INJECTION, EMULSION INTRAVENOUS at 12:22

## 2023-06-27 RX ADMIN — PROPOFOL 50 MG: 10 INJECTION, EMULSION INTRAVENOUS at 12:27

## 2023-06-27 RX ADMIN — PROPOFOL 50 MG: 10 INJECTION, EMULSION INTRAVENOUS at 12:19

## 2023-06-27 RX ADMIN — SODIUM CHLORIDE: 9 INJECTION, SOLUTION INTRAVENOUS at 12:12

## 2023-06-27 RX ADMIN — SODIUM CHLORIDE 100 ML/HR: 9 INJECTION, SOLUTION INTRAVENOUS at 11:01

## 2023-06-27 RX ADMIN — PROPOFOL 100 MG: 10 INJECTION, EMULSION INTRAVENOUS at 12:16

## 2023-06-27 ASSESSMENT — LIFESTYLE VARIABLES: SMOKING_STATUS: 0

## 2023-06-27 ASSESSMENT — ENCOUNTER SYMPTOMS: SHORTNESS OF BREATH: 0

## 2023-06-27 ASSESSMENT — PAIN - FUNCTIONAL ASSESSMENT: PAIN_FUNCTIONAL_ASSESSMENT: NONE - DENIES PAIN

## 2023-12-05 ASSESSMENT — PATIENT HEALTH QUESTIONNAIRE - PHQ9
1. LITTLE INTEREST OR PLEASURE IN DOING THINGS: MORE THAN HALF THE DAYS
7. TROUBLE CONCENTRATING ON THINGS, SUCH AS READING THE NEWSPAPER OR WATCHING TELEVISION: SEVERAL DAYS
SUM OF ALL RESPONSES TO PHQ QUESTIONS 1-9: 13
10. IF YOU CHECKED OFF ANY PROBLEMS, HOW DIFFICULT HAVE THESE PROBLEMS MADE IT FOR YOU TO DO YOUR WORK, TAKE CARE OF THINGS AT HOME, OR GET ALONG WITH OTHER PEOPLE: 2
SUM OF ALL RESPONSES TO PHQ9 QUESTIONS 1 & 2: 3
5. POOR APPETITE OR OVEREATING: NEARLY EVERY DAY
3. TROUBLE FALLING OR STAYING ASLEEP: NEARLY EVERY DAY
SUM OF ALL RESPONSES TO PHQ QUESTIONS 1-9: 13
2. FEELING DOWN, DEPRESSED OR HOPELESS: 1
SUM OF ALL RESPONSES TO PHQ QUESTIONS 1-9: 13
4. FEELING TIRED OR HAVING LITTLE ENERGY: NEARLY EVERY DAY
8. MOVING OR SPEAKING SO SLOWLY THAT OTHER PEOPLE COULD HAVE NOTICED. OR THE OPPOSITE, BEING SO FIGETY OR RESTLESS THAT YOU HAVE BEEN MOVING AROUND A LOT MORE THAN USUAL: 0
5. POOR APPETITE OR OVEREATING: 3
1. LITTLE INTEREST OR PLEASURE IN DOING THINGS: 2
7. TROUBLE CONCENTRATING ON THINGS, SUCH AS READING THE NEWSPAPER OR WATCHING TELEVISION: 1
SUM OF ALL RESPONSES TO PHQ QUESTIONS 1-9: 13
3. TROUBLE FALLING OR STAYING ASLEEP: 3
6. FEELING BAD ABOUT YOURSELF - OR THAT YOU ARE A FAILURE OR HAVE LET YOURSELF OR YOUR FAMILY DOWN: NOT AT ALL
4. FEELING TIRED OR HAVING LITTLE ENERGY: 3
2. FEELING DOWN, DEPRESSED OR HOPELESS: SEVERAL DAYS
9. THOUGHTS THAT YOU WOULD BE BETTER OFF DEAD, OR OF HURTING YOURSELF: 0
SUM OF ALL RESPONSES TO PHQ9 QUESTIONS 1 & 2: 3
6. FEELING BAD ABOUT YOURSELF - OR THAT YOU ARE A FAILURE OR HAVE LET YOURSELF OR YOUR FAMILY DOWN: 0
SUM OF ALL RESPONSES TO PHQ QUESTIONS 1-9: 13
8. MOVING OR SPEAKING SO SLOWLY THAT OTHER PEOPLE COULD HAVE NOTICED. OR THE OPPOSITE - BEING SO FIDGETY OR RESTLESS THAT YOU HAVE BEEN MOVING AROUND A LOT MORE THAN USUAL: NOT AT ALL
9. THOUGHTS THAT YOU WOULD BE BETTER OFF DEAD, OR OF HURTING YOURSELF: NOT AT ALL
10. IF YOU CHECKED OFF ANY PROBLEMS, HOW DIFFICULT HAVE THESE PROBLEMS MADE IT FOR YOU TO DO YOUR WORK, TAKE CARE OF THINGS AT HOME, OR GET ALONG WITH OTHER PEOPLE: VERY DIFFICULT

## 2023-12-06 ENCOUNTER — OFFICE VISIT (OUTPATIENT)
Dept: FAMILY MEDICINE CLINIC | Age: 53
End: 2023-12-06
Payer: COMMERCIAL

## 2023-12-06 VITALS
DIASTOLIC BLOOD PRESSURE: 84 MMHG | SYSTOLIC BLOOD PRESSURE: 120 MMHG | OXYGEN SATURATION: 97 % | HEART RATE: 77 BPM | HEIGHT: 71 IN | BODY MASS INDEX: 32.7 KG/M2 | WEIGHT: 233.6 LBS

## 2023-12-06 DIAGNOSIS — Z13.220 SCREENING FOR HYPERLIPIDEMIA: ICD-10-CM

## 2023-12-06 DIAGNOSIS — Z00.00 ENCOUNTER FOR ANNUAL PHYSICAL EXAM: Primary | ICD-10-CM

## 2023-12-06 DIAGNOSIS — R53.83 OTHER FATIGUE: ICD-10-CM

## 2023-12-06 DIAGNOSIS — Z12.5 SCREENING FOR PROSTATE CANCER: ICD-10-CM

## 2023-12-06 DIAGNOSIS — E03.9 ACQUIRED HYPOTHYROIDISM: ICD-10-CM

## 2023-12-06 LAB
BASOPHILS # BLD: 0.1 K/UL (ref 0–0.2)
BASOPHILS NFR BLD: 0.9 %
DEPRECATED RDW RBC AUTO: 13.9 % (ref 12.4–15.4)
EOSINOPHIL # BLD: 0.4 K/UL (ref 0–0.6)
EOSINOPHIL NFR BLD: 6.3 %
HCT VFR BLD AUTO: 44.6 % (ref 40.5–52.5)
HGB BLD-MCNC: 15.6 G/DL (ref 13.5–17.5)
LYMPHOCYTES # BLD: 1.3 K/UL (ref 1–5.1)
LYMPHOCYTES NFR BLD: 21.4 %
MCH RBC QN AUTO: 32.3 PG (ref 26–34)
MCHC RBC AUTO-ENTMCNC: 35 G/DL (ref 31–36)
MCV RBC AUTO: 92.3 FL (ref 80–100)
MONOCYTES # BLD: 0.4 K/UL (ref 0–1.3)
MONOCYTES NFR BLD: 7.2 %
NEUTROPHILS # BLD: 3.9 K/UL (ref 1.7–7.7)
NEUTROPHILS NFR BLD: 64.2 %
PLATELET # BLD AUTO: 174 K/UL (ref 135–450)
PMV BLD AUTO: 8.9 FL (ref 5–10.5)
PSA SERPL DL<=0.01 NG/ML-MCNC: 1.22 NG/ML (ref 0–4)
RBC # BLD AUTO: 4.83 M/UL (ref 4.2–5.9)
T4 FREE SERPL-MCNC: 1.6 NG/DL (ref 0.9–1.8)
TSH SERPL DL<=0.005 MIU/L-ACNC: 1.5 UIU/ML (ref 0.27–4.2)
WBC # BLD AUTO: 6 K/UL (ref 4–11)

## 2023-12-06 PROCEDURE — 99213 OFFICE O/P EST LOW 20 MIN: CPT | Performed by: NURSE PRACTITIONER

## 2023-12-06 SDOH — ECONOMIC STABILITY: HOUSING INSECURITY
IN THE LAST 12 MONTHS, WAS THERE A TIME WHEN YOU DID NOT HAVE A STEADY PLACE TO SLEEP OR SLEPT IN A SHELTER (INCLUDING NOW)?: NO

## 2023-12-06 SDOH — ECONOMIC STABILITY: FOOD INSECURITY: WITHIN THE PAST 12 MONTHS, YOU WORRIED THAT YOUR FOOD WOULD RUN OUT BEFORE YOU GOT MONEY TO BUY MORE.: NEVER TRUE

## 2023-12-06 SDOH — ECONOMIC STABILITY: INCOME INSECURITY: HOW HARD IS IT FOR YOU TO PAY FOR THE VERY BASICS LIKE FOOD, HOUSING, MEDICAL CARE, AND HEATING?: NOT HARD AT ALL

## 2023-12-06 SDOH — ECONOMIC STABILITY: FOOD INSECURITY: WITHIN THE PAST 12 MONTHS, THE FOOD YOU BOUGHT JUST DIDN'T LAST AND YOU DIDN'T HAVE MONEY TO GET MORE.: NEVER TRUE

## 2023-12-06 ASSESSMENT — ENCOUNTER SYMPTOMS
RESPIRATORY NEGATIVE: 1
GASTROINTESTINAL NEGATIVE: 1

## 2023-12-06 NOTE — PROGRESS NOTES
2023    Jenn Brush (:  1970) is a 47 y.o. male, here for a preventive medicine evaluation. States here for annual physical. States only concern is feeling tired and fatigue. States its been over the last month he has felt that way. States some anxiety but feels that's related to the weather and change in seasons. States he does have hypothyroidism and takes synthroid. Denies any issues with falling asleep or staying asleep. Has been evaluated for sleep apnea. Denies chest pain or sob. States he could be eating better. Patient Active Problem List   Diagnosis    Hypothyroidism       Review of Systems   Constitutional:  Positive for activity change, appetite change and fatigue. HENT: Negative. Respiratory: Negative. Cardiovascular: Negative. Gastrointestinal: Negative. Endocrine: Negative. Musculoskeletal: Negative. Neurological: Negative. Prior to Visit Medications    Medication Sig Taking?  Authorizing Provider   levothyroxine (SYNTHROID) 137 MCG tablet TAKE 1 TABLET DAILY Yes Lacy Del Valle MD        No Known Allergies    Past Medical History:   Diagnosis Date    Hypothyroidism since ish    Thyroid disease        Past Surgical History:   Procedure Laterality Date    COLONOSCOPY N/A 2023    COLONOSCOPY POLYPECTOMY SNARE/COLD BIOPSY performed by Judy Vaca MD at [facility]         Social History     Socioeconomic History    Marital status:      Spouse name: Not on file    Number of children: Not on file    Years of education: Not on file    Highest education level: Not on file   Occupational History    Not on file   Tobacco Use    Smoking status: Never    Smokeless tobacco: Never   Vaping Use    Vaping Use: Never used   Substance and Sexual Activity    Alcohol use: Never    Drug use: Never    Sexual activity: Yes     Partners: Female   Other Topics Concern    Not on file

## 2023-12-07 LAB
CHOLEST SERPL-MCNC: 206 MG/DL (ref 0–199)
HDLC SERPL-MCNC: 41 MG/DL (ref 40–60)
LDLC SERPL CALC-MCNC: 137 MG/DL
TRIGL SERPL-MCNC: 139 MG/DL (ref 0–150)
VLDLC SERPL CALC-MCNC: 28 MG/DL

## 2024-02-26 ENCOUNTER — OFFICE VISIT (OUTPATIENT)
Dept: FAMILY MEDICINE CLINIC | Age: 54
End: 2024-02-26
Payer: COMMERCIAL

## 2024-02-26 VITALS
SYSTOLIC BLOOD PRESSURE: 144 MMHG | WEIGHT: 229 LBS | OXYGEN SATURATION: 98 % | HEART RATE: 90 BPM | HEIGHT: 71 IN | DIASTOLIC BLOOD PRESSURE: 90 MMHG | RESPIRATION RATE: 22 BRPM | BODY MASS INDEX: 32.06 KG/M2

## 2024-02-26 DIAGNOSIS — I10 PRIMARY HYPERTENSION: Primary | ICD-10-CM

## 2024-02-26 DIAGNOSIS — R05.2 SUBACUTE COUGH: ICD-10-CM

## 2024-02-26 PROCEDURE — 3080F DIAST BP >= 90 MM HG: CPT | Performed by: FAMILY MEDICINE

## 2024-02-26 PROCEDURE — 99214 OFFICE O/P EST MOD 30 MIN: CPT | Performed by: FAMILY MEDICINE

## 2024-02-26 PROCEDURE — 3077F SYST BP >= 140 MM HG: CPT | Performed by: FAMILY MEDICINE

## 2024-02-26 RX ORDER — METOPROLOL SUCCINATE 25 MG/1
25 TABLET, EXTENDED RELEASE ORAL DAILY
Qty: 30 TABLET | Refills: 3 | Status: SHIPPED | OUTPATIENT
Start: 2024-02-26

## 2024-02-26 RX ORDER — LISINOPRIL 10 MG/1
10 TABLET ORAL DAILY
Qty: 90 TABLET | Refills: 1 | Status: SHIPPED | OUTPATIENT
Start: 2024-02-26 | End: 2024-02-26 | Stop reason: CLARIF

## 2024-02-26 RX ORDER — AZITHROMYCIN 250 MG/1
TABLET, FILM COATED ORAL
Qty: 1 PACKET | Refills: 0 | Status: SHIPPED | OUTPATIENT
Start: 2024-02-26 | End: 2024-03-07

## 2024-02-26 ASSESSMENT — PATIENT HEALTH QUESTIONNAIRE - PHQ9
5. POOR APPETITE OR OVEREATING: 0
SUM OF ALL RESPONSES TO PHQ QUESTIONS 1-9: 0
10. IF YOU CHECKED OFF ANY PROBLEMS, HOW DIFFICULT HAVE THESE PROBLEMS MADE IT FOR YOU TO DO YOUR WORK, TAKE CARE OF THINGS AT HOME, OR GET ALONG WITH OTHER PEOPLE: 0
3. TROUBLE FALLING OR STAYING ASLEEP: 0
2. FEELING DOWN, DEPRESSED OR HOPELESS: 0
SUM OF ALL RESPONSES TO PHQ QUESTIONS 1-9: 0
4. FEELING TIRED OR HAVING LITTLE ENERGY: 0
1. LITTLE INTEREST OR PLEASURE IN DOING THINGS: 0
8. MOVING OR SPEAKING SO SLOWLY THAT OTHER PEOPLE COULD HAVE NOTICED. OR THE OPPOSITE, BEING SO FIGETY OR RESTLESS THAT YOU HAVE BEEN MOVING AROUND A LOT MORE THAN USUAL: 0
6. FEELING BAD ABOUT YOURSELF - OR THAT YOU ARE A FAILURE OR HAVE LET YOURSELF OR YOUR FAMILY DOWN: 0
SUM OF ALL RESPONSES TO PHQ9 QUESTIONS 1 & 2: 0
7. TROUBLE CONCENTRATING ON THINGS, SUCH AS READING THE NEWSPAPER OR WATCHING TELEVISION: 0
9. THOUGHTS THAT YOU WOULD BE BETTER OFF DEAD, OR OF HURTING YOURSELF: 0

## 2024-02-26 NOTE — PROGRESS NOTES
2024    Oleksandr Ott (:  1970) is a 53 y.o. male, here for evaluation of the following chief complaint(s):  Cough (For about a month feels like he has drng in back of throat) and Hypertension (High BP when going to draw blood 140/90 most of the times. Has slight headache )      ASSESSMENT/PLAN:     Diagnosis Orders   1. Primary hypertension      new RX start Metoprolol 25 XL recheck few weeks      2. Subacute cough  azithromycin (ZITHROMAX Z-ASUNCION) 250 MG tablet    Nex RX cover atypicals z-pack          Return in about 2 weeks (around 3/11/2024) for HTN.    An electronic signature was used to authenticate this note.    SUBJECTIVE/OBJECTIVE:  (NOTE : prior results listed below reviewed at this visit to assist in medical decision making.)    HPI / ROS    Two new issues :     # cough persistent subacute x 4+ weeks no fever some congestion not SOB. No very productive cough.    # new issue HTN at home and here no CP/SOB.          Wt Readings from Last 3 Encounters:   24 103.9 kg (229 lb)   23 106 kg (233 lb 9.6 oz)   23 99.8 kg (220 lb)       BP Readings from Last 3 Encounters:   24 (!) 144/90   23 120/84   23 131/88       PHYSICAL EXAM  Vitals:    24 1203 24 1208   BP: (!) 146/96 (!) 144/90   Pulse: 90    Resp: 22    SpO2: 98%    Weight: 103.9 kg (229 lb)    Height: 1.803 m (5' 11\")      A&o  Car reg no MGR  Lungs cta  Ext no edema

## 2024-03-10 NOTE — PROGRESS NOTES
3/11/2024    Oleksandr Ott (:  1970) is a 53 y.o. male, here for evaluation of the following chief complaint(s):  Hypertension      ASSESSMENT/PLAN:     Diagnosis Orders   1. Primary hypertension  Basic Metabolic Panel    at goal but could yolanda a bit more help; may increase toprol to 25 BID          Return in about 6 months (around 2024) for Well Adult, screen lipid, screen psa, HTN.    An electronic signature was used to authenticate this note.    SUBJECTIVE/OBJECTIVE:  (NOTE : prior results listed below reviewed at this visit to assist in medical decision making.)    HPI / ROS    # HTN - meeta meds no CP/SOB  BP Readings from Last 3 Encounters:   24 124/70   24 (!) 144/90   23 120/84     Lab Results   Component Value Date/Time     2021 09:02 AM    K 4.0 2021 09:02 AM    CL 99 2021 09:02 AM    CO2 24 2021 09:02 AM    BUN 15 2021 09:02 AM    CREATININE 1.0 2021 09:02 AM    GLUCOSE 100 2021 09:02 AM    CALCIUM 9.1 2021 09:02 AM       Started topril XL 25 HS feeling better on this no HA etc    Some improvement in HA type sx but needs more help           Wt Readings from Last 3 Encounters:   24 105.2 kg (232 lb)   24 103.9 kg (229 lb)   23 106 kg (233 lb 9.6 oz)       BP Readings from Last 3 Encounters:   24 124/70   24 (!) 144/90   23 120/84       PHYSICAL EXAM  Vitals:    24   BP: 124/70   Pulse: 75   SpO2: 96%   Weight: 105.2 kg (232 lb)   Height: 1.803 m (5' 11\")     A&o  Neck no TMG no bruit  Car reg no MGR  Lungs cta  Ext no edema  Skin no jaundice  Eyes anicteric

## 2024-03-11 ENCOUNTER — OFFICE VISIT (OUTPATIENT)
Dept: FAMILY MEDICINE CLINIC | Age: 54
End: 2024-03-11
Payer: COMMERCIAL

## 2024-03-11 VITALS
WEIGHT: 232 LBS | SYSTOLIC BLOOD PRESSURE: 124 MMHG | BODY MASS INDEX: 32.48 KG/M2 | HEIGHT: 71 IN | HEART RATE: 75 BPM | DIASTOLIC BLOOD PRESSURE: 70 MMHG | OXYGEN SATURATION: 96 %

## 2024-03-11 DIAGNOSIS — I10 PRIMARY HYPERTENSION: Primary | ICD-10-CM

## 2024-03-11 LAB
ANION GAP SERPL CALCULATED.3IONS-SCNC: 10 MMOL/L (ref 3–16)
BUN SERPL-MCNC: 14 MG/DL (ref 7–20)
CALCIUM SERPL-MCNC: 9.5 MG/DL (ref 8.3–10.6)
CHLORIDE SERPL-SCNC: 102 MMOL/L (ref 99–110)
CO2 SERPL-SCNC: 28 MMOL/L (ref 21–32)
CREAT SERPL-MCNC: 1 MG/DL (ref 0.9–1.3)
GFR SERPLBLD CREATININE-BSD FMLA CKD-EPI: >60 ML/MIN/{1.73_M2}
GLUCOSE SERPL-MCNC: 100 MG/DL (ref 70–99)
POTASSIUM SERPL-SCNC: 4.4 MMOL/L (ref 3.5–5.1)
SODIUM SERPL-SCNC: 140 MMOL/L (ref 136–145)

## 2024-03-11 PROCEDURE — 99213 OFFICE O/P EST LOW 20 MIN: CPT | Performed by: FAMILY MEDICINE

## 2024-03-11 PROCEDURE — 3074F SYST BP LT 130 MM HG: CPT | Performed by: FAMILY MEDICINE

## 2024-03-11 PROCEDURE — 3078F DIAST BP <80 MM HG: CPT | Performed by: FAMILY MEDICINE

## 2024-03-11 ASSESSMENT — PATIENT HEALTH QUESTIONNAIRE - PHQ9
SUM OF ALL RESPONSES TO PHQ QUESTIONS 1-9: 0
SUM OF ALL RESPONSES TO PHQ QUESTIONS 1-9: 0
SUM OF ALL RESPONSES TO PHQ9 QUESTIONS 1 & 2: 0
SUM OF ALL RESPONSES TO PHQ QUESTIONS 1-9: 0
2. FEELING DOWN, DEPRESSED OR HOPELESS: 0
SUM OF ALL RESPONSES TO PHQ QUESTIONS 1-9: 0
1. LITTLE INTEREST OR PLEASURE IN DOING THINGS: 0

## 2024-04-05 ENCOUNTER — OFFICE VISIT (OUTPATIENT)
Dept: FAMILY MEDICINE CLINIC | Age: 54
End: 2024-04-05
Payer: COMMERCIAL

## 2024-04-05 VITALS
BODY MASS INDEX: 32.06 KG/M2 | SYSTOLIC BLOOD PRESSURE: 130 MMHG | DIASTOLIC BLOOD PRESSURE: 86 MMHG | RESPIRATION RATE: 18 BRPM | HEIGHT: 71 IN | OXYGEN SATURATION: 99 % | HEART RATE: 66 BPM | WEIGHT: 229 LBS

## 2024-04-05 DIAGNOSIS — I10 PRIMARY HYPERTENSION: Primary | ICD-10-CM

## 2024-04-05 PROCEDURE — 99212 OFFICE O/P EST SF 10 MIN: CPT | Performed by: FAMILY MEDICINE

## 2024-04-05 PROCEDURE — 3075F SYST BP GE 130 - 139MM HG: CPT | Performed by: FAMILY MEDICINE

## 2024-04-05 PROCEDURE — 3079F DIAST BP 80-89 MM HG: CPT | Performed by: FAMILY MEDICINE

## 2024-04-05 RX ORDER — METOPROLOL TARTRATE 50 MG/1
50 TABLET, FILM COATED ORAL 2 TIMES DAILY
Qty: 60 TABLET | Refills: 0 | Status: SHIPPED | OUTPATIENT
Start: 2024-04-05

## 2024-04-05 ASSESSMENT — PATIENT HEALTH QUESTIONNAIRE - PHQ9
2. FEELING DOWN, DEPRESSED OR HOPELESS: NOT AT ALL
SUM OF ALL RESPONSES TO PHQ QUESTIONS 1-9: 0
SUM OF ALL RESPONSES TO PHQ9 QUESTIONS 1 & 2: 0
SUM OF ALL RESPONSES TO PHQ QUESTIONS 1-9: 0
1. LITTLE INTEREST OR PLEASURE IN DOING THINGS: NOT AT ALL
SUM OF ALL RESPONSES TO PHQ QUESTIONS 1-9: 0
SUM OF ALL RESPONSES TO PHQ QUESTIONS 1-9: 0

## 2024-04-05 NOTE — PROGRESS NOTES
2024    Oleksandr Ott (:  1970) is a 54 y.o. male, here for evaluation of the following chief complaint(s):  Medication Check (Is having skin problem unsure if due to medication)      ASSESSMENT/PLAN:     Diagnosis Orders   1. Primary hypertension      now at high end of goal; increase to 50 BID and revisit 3 weeks          No follow-ups on file.    An electronic signature was used to authenticate this note.    SUBJECTIVE/OBJECTIVE:  (NOTE : prior results listed below reviewed at this visit to assist in medical decision making.)    HPI / ROS    # HTN - meeta meds no CP/SOB;BP increased to toprol BID 25 mg   BP Readings from Last 3 Encounters:   24 130/86   24 124/70   24 (!) 144/90     Lab Results   Component Value Date/Time     2024 09:21 AM    K 4.4 2024 09:21 AM     2024 09:21 AM    CO2 28 2024 09:21 AM    BUN 14 2024 09:21 AM    CREATININE 1.0 2024 09:21 AM    GLUCOSE 100 2024 09:21 AM    CALCIUM 9.5 2024 09:21 AM         # hypothyroidism on Levo  Lab Results   Component Value Date    TSH 3.85 2022    T2UAWYL 0.83 10/24/2011    N6PZASM 6.8 2010    FT3 3.4 2016    T4FREE 1.6 2023       # PSA screening history:  Lab Results   Component Value Date    PSA 1.22 2023    PSA 0.48 2022    PSA 0.48 2021     UTD    # Lipids - recent screening history:  Lab Results   Component Value Date    LDLCALC 137 (H) 2023     Lab Results   Component Value Date    TRIG 139 2023     Lab Results   Component Value Date    HDL 41 2023     Lab Results   Component Value Date    CHOL 206 (H) 2023     UTD      Wt Readings from Last 3 Encounters:   24 103.9 kg (229 lb)   24 105.2 kg (232 lb)   24 103.9 kg (229 lb)       BP Readings from Last 3 Encounters:   24 130/86   24 124/70   24 (!) 144/90       PHYSICAL EXAM  Vitals:    24 0830   BP:

## 2024-04-30 ENCOUNTER — OFFICE VISIT (OUTPATIENT)
Dept: FAMILY MEDICINE CLINIC | Age: 54
End: 2024-04-30
Payer: COMMERCIAL

## 2024-04-30 VITALS
DIASTOLIC BLOOD PRESSURE: 72 MMHG | SYSTOLIC BLOOD PRESSURE: 116 MMHG | HEART RATE: 73 BPM | BODY MASS INDEX: 31.92 KG/M2 | HEIGHT: 71 IN | RESPIRATION RATE: 16 BRPM | OXYGEN SATURATION: 96 % | WEIGHT: 228 LBS

## 2024-04-30 DIAGNOSIS — I10 PRIMARY HYPERTENSION: Primary | ICD-10-CM

## 2024-04-30 PROCEDURE — 99212 OFFICE O/P EST SF 10 MIN: CPT | Performed by: FAMILY MEDICINE

## 2024-04-30 PROCEDURE — 3078F DIAST BP <80 MM HG: CPT | Performed by: FAMILY MEDICINE

## 2024-04-30 PROCEDURE — 3074F SYST BP LT 130 MM HG: CPT | Performed by: FAMILY MEDICINE

## 2024-04-30 RX ORDER — METOPROLOL TARTRATE 50 MG/1
50 TABLET, FILM COATED ORAL 2 TIMES DAILY
Qty: 180 TABLET | Refills: 3 | Status: SHIPPED | OUTPATIENT
Start: 2024-04-30 | End: 2025-04-25

## 2024-04-30 NOTE — PROGRESS NOTES
2024    Oleksandr Ott (:  1970) is a 54 y.o. male, here for evaluation of the following chief complaint(s):  Medication Check (No change)      ASSESSMENT/PLAN:     Diagnosis Orders   1. Primary hypertension      stay on current dose          Return in about 6 months (around 10/30/2024) for HTN, Well Adult, screen lipid, screen psa.    An electronic signature was used to authenticate this note.    SUBJECTIVE/OBJECTIVE:  (NOTE : prior results listed below reviewed at this visit to assist in medical decision making.)    HPI / ROS    # HTN - meeta meds no CP/SOB  BP Readings from Last 3 Encounters:   24 116/72   24 130/86   24 124/70     Lab Results   Component Value Date/Time     2024 09:21 AM    K 4.4 2024 09:21 AM     2024 09:21 AM    CO2 28 2024 09:21 AM    BUN 14 2024 09:21 AM    CREATININE 1.0 2024 09:21 AM    GLUCOSE 100 2024 09:21 AM    CALCIUM 9.5 2024 09:21 AM       F/u metoprolol 50 BID    # PSA screening history: UTD  Lab Results   Component Value Date    PSA 1.22 2023    PSA 0.48 2022    PSA 0.48 2021           Wt Readings from Last 3 Encounters:   24 103.4 kg (228 lb)   24 103.9 kg (229 lb)   24 105.2 kg (232 lb)       BP Readings from Last 3 Encounters:   24 116/72   24 130/86   24 124/70       PHYSICAL EXAM  Vitals:    24 0847   BP: 116/72   Pulse: 73   Resp: 16   SpO2: 96%   Weight: 103.4 kg (228 lb)   Height: 1.803 m (5' 11\")     A&o  Car reg no MGR  Lungs cta

## 2024-06-15 DIAGNOSIS — E03.9 HYPOTHYROIDISM, UNSPECIFIED TYPE: ICD-10-CM

## 2024-06-17 NOTE — TELEPHONE ENCOUNTER
Medication:   Requested Prescriptions     Pending Prescriptions Disp Refills    levothyroxine (SYNTHROID) 137 MCG tablet 90 tablet 3     Sig: TAKE 1 TABLET DAILY       Last Filled:  4/10/2023    Patient Phone Number: [phone] (home)     Last appt: 4/30/2024   Next appt: Visit date not found

## 2024-06-18 RX ORDER — LEVOTHYROXINE SODIUM 137 UG/1
TABLET ORAL
Qty: 90 TABLET | Refills: 3 | Status: SHIPPED | OUTPATIENT
Start: 2024-06-18

## 2024-07-25 DIAGNOSIS — E03.9 HYPOTHYROIDISM, UNSPECIFIED TYPE: ICD-10-CM

## 2024-07-25 RX ORDER — LEVOTHYROXINE SODIUM 137 UG/1
TABLET ORAL
Qty: 90 TABLET | Refills: 3 | Status: SHIPPED | OUTPATIENT
Start: 2024-07-25

## 2024-07-25 NOTE — TELEPHONE ENCOUNTER
Pt needs medication sent to [facility] instead of [facility]. Please advise.   Medication:   Requested Prescriptions     Pending Prescriptions Disp Refills    levothyroxine (SYNTHROID) 137 MCG tablet 90 tablet 3     Sig: TAKE 1 TABLET DAILY       Last Filled:  6/18/2024    Patient Phone Number: [phone] (home)     Last appt: 4/30/2024   Next appt: Visit date not found

## 2024-12-11 SDOH — ECONOMIC STABILITY: INCOME INSECURITY: HOW HARD IS IT FOR YOU TO PAY FOR THE VERY BASICS LIKE FOOD, HOUSING, MEDICAL CARE, AND HEATING?: NOT HARD AT ALL

## 2024-12-11 SDOH — ECONOMIC STABILITY: FOOD INSECURITY: WITHIN THE PAST 12 MONTHS, YOU WORRIED THAT YOUR FOOD WOULD RUN OUT BEFORE YOU GOT MONEY TO BUY MORE.: NEVER TRUE

## 2024-12-11 SDOH — ECONOMIC STABILITY: FOOD INSECURITY: WITHIN THE PAST 12 MONTHS, THE FOOD YOU BOUGHT JUST DIDN'T LAST AND YOU DIDN'T HAVE MONEY TO GET MORE.: NEVER TRUE

## 2024-12-11 SDOH — ECONOMIC STABILITY: TRANSPORTATION INSECURITY
IN THE PAST 12 MONTHS, HAS LACK OF TRANSPORTATION KEPT YOU FROM MEETINGS, WORK, OR FROM GETTING THINGS NEEDED FOR DAILY LIVING?: NO

## 2024-12-12 ENCOUNTER — OFFICE VISIT (OUTPATIENT)
Dept: FAMILY MEDICINE CLINIC | Age: 54
End: 2024-12-12
Payer: COMMERCIAL

## 2024-12-12 VITALS
BODY MASS INDEX: 33.18 KG/M2 | RESPIRATION RATE: 18 BRPM | SYSTOLIC BLOOD PRESSURE: 124 MMHG | HEIGHT: 71 IN | DIASTOLIC BLOOD PRESSURE: 86 MMHG | HEART RATE: 68 BPM | OXYGEN SATURATION: 98 % | WEIGHT: 237 LBS

## 2024-12-12 DIAGNOSIS — Z12.5 SCREENING PSA (PROSTATE SPECIFIC ANTIGEN): ICD-10-CM

## 2024-12-12 DIAGNOSIS — I10 PRIMARY HYPERTENSION: ICD-10-CM

## 2024-12-12 DIAGNOSIS — Z00.00 ROUTINE GENERAL MEDICAL EXAMINATION AT A HEALTH CARE FACILITY: Primary | ICD-10-CM

## 2024-12-12 DIAGNOSIS — E03.9 HYPOTHYROIDISM, UNSPECIFIED TYPE: ICD-10-CM

## 2024-12-12 DIAGNOSIS — Z13.220 SCREENING FOR HYPERLIPIDEMIA: ICD-10-CM

## 2024-12-12 LAB
ANION GAP SERPL CALCULATED.3IONS-SCNC: 9 MMOL/L (ref 3–16)
BUN SERPL-MCNC: 13 MG/DL (ref 7–20)
CALCIUM SERPL-MCNC: 9.5 MG/DL (ref 8.3–10.6)
CHLORIDE SERPL-SCNC: 105 MMOL/L (ref 99–110)
CHOLEST SERPL-MCNC: 177 MG/DL (ref 0–199)
CO2 SERPL-SCNC: 28 MMOL/L (ref 21–32)
CREAT SERPL-MCNC: 1 MG/DL (ref 0.9–1.3)
GFR SERPLBLD CREATININE-BSD FMLA CKD-EPI: 90 ML/MIN/{1.73_M2}
GLUCOSE SERPL-MCNC: 99 MG/DL (ref 70–99)
HDLC SERPL-MCNC: 33 MG/DL (ref 40–60)
LDLC SERPL CALC-MCNC: 104 MG/DL
POTASSIUM SERPL-SCNC: 4.7 MMOL/L (ref 3.5–5.1)
PSA SERPL DL<=0.01 NG/ML-MCNC: 0.52 NG/ML (ref 0–4)
SODIUM SERPL-SCNC: 142 MMOL/L (ref 136–145)
T4 FREE SERPL-MCNC: 1.6 NG/DL (ref 0.9–1.8)
TRIGL SERPL-MCNC: 198 MG/DL (ref 0–150)
TSH SERPL DL<=0.005 MIU/L-ACNC: 0.4 UIU/ML (ref 0.27–4.2)
VLDLC SERPL CALC-MCNC: 40 MG/DL

## 2024-12-12 PROCEDURE — 3079F DIAST BP 80-89 MM HG: CPT | Performed by: FAMILY MEDICINE

## 2024-12-12 PROCEDURE — 99396 PREV VISIT EST AGE 40-64: CPT | Performed by: FAMILY MEDICINE

## 2024-12-12 PROCEDURE — 36415 COLL VENOUS BLD VENIPUNCTURE: CPT | Performed by: FAMILY MEDICINE

## 2024-12-12 PROCEDURE — 3074F SYST BP LT 130 MM HG: CPT | Performed by: FAMILY MEDICINE

## 2024-12-12 ASSESSMENT — PATIENT HEALTH QUESTIONNAIRE - PHQ9
1. LITTLE INTEREST OR PLEASURE IN DOING THINGS: NOT AT ALL
SUM OF ALL RESPONSES TO PHQ QUESTIONS 1-9: 0
2. FEELING DOWN, DEPRESSED OR HOPELESS: NOT AT ALL
SUM OF ALL RESPONSES TO PHQ9 QUESTIONS 1 & 2: 0
SUM OF ALL RESPONSES TO PHQ QUESTIONS 1-9: 0

## 2024-12-12 NOTE — PROGRESS NOTES
2024    Oleksandr Ott (:  1970) is a 54 y.o. male, here for evaluation of the following chief complaint(s):  Annual Exam      ASSESSMENT/PLAN:     Diagnosis Orders   1. Routine general medical examination at a health care facility        2. Screening for hyperlipidemia  LIPID PANEL      3. Screening PSA (prostate specific antigen)  PSA Screening      4. Hypothyroidism, unspecified type  TSH    T4, Free    lasb on Levo and adjust      5. Primary hypertension  Basic Metabolic Panel    at goal cont meds check renal          Return in about 1 year (around 2025) for Well Adult, screen psa, screen lipid, HTN, Hypothyroidism.    An electronic signature was used to authenticate this note.    SUBJECTIVE/OBJECTIVE:  (NOTE : prior results listed below reviewed at this visit to assist in medical decision making.)    HPI / ROS    # Preventive and other issues    # PSA screening history:  Lab Results   Component Value Date    PSA 0.52 2024    PSA 1.22 2023    PSA 0.48 2022     # Lipids - recent screening history:  Lab Results   Component Value Date    CHOL 177 2024    TRIG 198 (H) 2024    HDL 33 (L) 2024     (H) 2024    VLDL 40 2024     The 10-year ASCVD risk score (Aracelis TOMAS, et al., 2019) is: 6.5%    Values used to calculate the score:      Age: 54 years      Sex: Male      Is Non- : No      Diabetic: No      Tobacco smoker: No      Systolic Blood Pressure: 124 mmHg      Is BP treated: Yes      HDL Cholesterol: 33 mg/dL      Total Cholesterol: 177 mg/dL        # HTN - meeta meds no CP/SOB  BP Readings from Last 3 Encounters:   24 124/86   24 116/72   24 130/86     Lab Results   Component Value Date/Time     2024 08:14 AM    K 4.7 2024 08:14 AM     2024 08:14 AM    CO2 28 2024 08:14 AM    BUN 13 2024 08:14 AM    CREATININE 1.0 2024 08:14 AM    GLUCOSE 99

## 2025-02-28 ENCOUNTER — OFFICE VISIT (OUTPATIENT)
Dept: FAMILY MEDICINE CLINIC | Age: 55
End: 2025-02-28

## 2025-02-28 VITALS
WEIGHT: 239 LBS | DIASTOLIC BLOOD PRESSURE: 88 MMHG | RESPIRATION RATE: 20 BRPM | HEIGHT: 71 IN | BODY MASS INDEX: 33.46 KG/M2 | OXYGEN SATURATION: 96 % | HEART RATE: 56 BPM | SYSTOLIC BLOOD PRESSURE: 132 MMHG

## 2025-02-28 DIAGNOSIS — G89.29 CHRONIC ANKLE PAIN, UNSPECIFIED LATERALITY: Primary | ICD-10-CM

## 2025-02-28 DIAGNOSIS — M25.579 CHRONIC ANKLE PAIN, UNSPECIFIED LATERALITY: Primary | ICD-10-CM

## 2025-02-28 LAB — URATE SERPL-MCNC: 7.8 MG/DL (ref 3.5–7.2)

## 2025-02-28 SDOH — ECONOMIC STABILITY: FOOD INSECURITY: WITHIN THE PAST 12 MONTHS, THE FOOD YOU BOUGHT JUST DIDN'T LAST AND YOU DIDN'T HAVE MONEY TO GET MORE.: NEVER TRUE

## 2025-02-28 SDOH — ECONOMIC STABILITY: FOOD INSECURITY: WITHIN THE PAST 12 MONTHS, YOU WORRIED THAT YOUR FOOD WOULD RUN OUT BEFORE YOU GOT MONEY TO BUY MORE.: NEVER TRUE

## 2025-02-28 ASSESSMENT — PATIENT HEALTH QUESTIONNAIRE - PHQ9
SUM OF ALL RESPONSES TO PHQ QUESTIONS 1-9: 0
SUM OF ALL RESPONSES TO PHQ9 QUESTIONS 1 & 2: 0
SUM OF ALL RESPONSES TO PHQ QUESTIONS 1-9: 0
2. FEELING DOWN, DEPRESSED OR HOPELESS: NOT AT ALL
1. LITTLE INTEREST OR PLEASURE IN DOING THINGS: NOT AT ALL

## 2025-02-28 NOTE — PROGRESS NOTES
2025    Oleksandr Ott (:  1970) is a 54 y.o. male, here for evaluation of the following chief complaint(s):  Joint Swelling (Right ankle swelling for a few months, no injury, ROM WNL but pain maybe a 1. Warm to touch)      ASSESSMENT/PLAN:     Diagnosis Orders   1. Chronic ankle pain, unspecified laterality  Uric Acid    suspect gout check uric acid; not on meds that cause gout          Return in about 4 months (around 2025) for HTN.    An electronic signature was used to authenticate this note.    SUBJECTIVE/OBJECTIVE:  (NOTE : prior results listed below reviewed at this visit to assist in medical decision making.)    HPI / ROS    # new issue ankle pain and swelling x 2 months; no truama hx    # HTN - meeta meds no CP/SOB  BP Readings from Last 3 Encounters:   25 132/88   24 124/86   24 116/72     Lab Results   Component Value Date/Time     2024 08:14 AM    K 4.7 2024 08:14 AM     2024 08:14 AM    CO2 28 2024 08:14 AM    BUN 13 2024 08:14 AM    CREATININE 1.0 2024 08:14 AM    GLUCOSE 99 2024 08:14 AM    CALCIUM 9.5 2024 08:14 AM       Labs UTD     # hypothyroidism on Levo labs UTD  Lab Results   Component Value Date    TSH 0.40 2024    D3DSBID 0.83 10/24/2011    FT3 3.4 2016    T4FREE 1.6 2024           Wt Readings from Last 3 Encounters:   25 108.4 kg (239 lb)   24 107.5 kg (237 lb)   24 103.4 kg (228 lb)       BP Readings from Last 3 Encounters:   25 132/88   24 124/86   24 116/72       PHYSICAL EXAM  Vitals:    25 1239   BP: 132/88   Pulse: 56   Resp: 20   SpO2: 96%   Weight: 108.4 kg (239 lb)   Height: 1.803 m (5' 11\")     A&o  Let ankle arm not hot mild sot tissue edema

## 2025-03-03 DIAGNOSIS — M1A.0220 IDIOPATHIC CHRONIC GOUT OF LEFT ELBOW WITHOUT TOPHUS: Primary | ICD-10-CM

## 2025-03-03 RX ORDER — ALLOPURINOL 300 MG/1
300 TABLET ORAL DAILY
Qty: 90 TABLET | Refills: 3 | Status: SHIPPED | OUTPATIENT
Start: 2025-03-03

## 2025-03-04 ENCOUNTER — TELEPHONE (OUTPATIENT)
Dept: FAMILY MEDICINE CLINIC | Age: 55
End: 2025-03-04

## 2025-04-04 ENCOUNTER — OFFICE VISIT (OUTPATIENT)
Dept: FAMILY MEDICINE CLINIC | Age: 55
End: 2025-04-04

## 2025-04-04 VITALS
HEIGHT: 71 IN | SYSTOLIC BLOOD PRESSURE: 124 MMHG | BODY MASS INDEX: 33.74 KG/M2 | WEIGHT: 241 LBS | RESPIRATION RATE: 18 BRPM | OXYGEN SATURATION: 97 % | DIASTOLIC BLOOD PRESSURE: 80 MMHG | HEART RATE: 72 BPM

## 2025-04-04 DIAGNOSIS — H02.401 PTOSIS OF EYELID, RIGHT: Primary | ICD-10-CM

## 2025-04-04 DIAGNOSIS — H02.401 PTOSIS OF EYELID, RIGHT: ICD-10-CM

## 2025-04-04 SDOH — ECONOMIC STABILITY: FOOD INSECURITY: WITHIN THE PAST 12 MONTHS, THE FOOD YOU BOUGHT JUST DIDN'T LAST AND YOU DIDN'T HAVE MONEY TO GET MORE.: NEVER TRUE

## 2025-04-04 SDOH — ECONOMIC STABILITY: FOOD INSECURITY: WITHIN THE PAST 12 MONTHS, YOU WORRIED THAT YOUR FOOD WOULD RUN OUT BEFORE YOU GOT MONEY TO BUY MORE.: NEVER TRUE

## 2025-04-04 ASSESSMENT — PATIENT HEALTH QUESTIONNAIRE - PHQ9
2. FEELING DOWN, DEPRESSED OR HOPELESS: NOT AT ALL
1. LITTLE INTEREST OR PLEASURE IN DOING THINGS: NOT AT ALL
SUM OF ALL RESPONSES TO PHQ QUESTIONS 1-9: 0

## 2025-04-04 NOTE — PROGRESS NOTES
2025    Oleksandr Ott (:  1970) is a 55 y.o. male, here for evaluation of the following chief complaint(s):  Eye Problem (Right eye seems to be droopy, seen eye MD did not see any issue for cause. Always low but worst by evening. The only change is the gout med, noticed a few weeks ago. )      ASSESSMENT/PLAN:     Diagnosis Orders   1. Ptosis of eyelid, right  ACETYLCHOLINE RECEPTOR    reviewed exam/ not cva not Bell's Palsy; acetylchloline receptor AB test r/o MG          Return if symptoms worsen or fail to improve.    An electronic signature was used to authenticate this note.    SUBJECTIVE/OBJECTIVE:  (NOTE : prior results listed below reviewed at this visit to assist in medical decision making.)    HPI / ROS    # acute for R eyelid dropping x 1 week told eye exam OK and eye function normal  No visual changes or diplopia        Wt Readings from Last 3 Encounters:   25 109.3 kg (241 lb)   25 108.4 kg (239 lb)   24 107.5 kg (237 lb)       BP Readings from Last 3 Encounters:   25 124/80   25 132/88   24 124/86       PHYSICAL EXAM  Vitals:    25 1238   BP: 124/80   Pulse: 72   Resp: 18   SpO2: 97%   Weight: 109.3 kg (241 lb)   Height: 1.803 m (5' 11\")     A&o  R lid softening of wrinkle of upper lid and lower lid; moderate ptosis; can elevated but weakly; brow and facial motions normal; EOMI

## 2025-04-07 LAB
ACHR BIND AB SER-SCNC: 0.3 NMOL/L (ref 0–0.4)
ACHR BLOCK AB/ACHR TOTAL SFR SER: 23 % (ref 0–26)

## 2025-04-09 ENCOUNTER — RESULTS FOLLOW-UP (OUTPATIENT)
Dept: FAMILY MEDICINE CLINIC | Age: 55
End: 2025-04-09

## 2025-05-30 RX ORDER — METOPROLOL TARTRATE 50 MG
50 TABLET ORAL 2 TIMES DAILY
Qty: 180 TABLET | Refills: 3 | Status: SHIPPED | OUTPATIENT
Start: 2025-05-30

## 2025-06-15 DIAGNOSIS — E03.9 HYPOTHYROIDISM, UNSPECIFIED TYPE: ICD-10-CM

## 2025-06-16 RX ORDER — LEVOTHYROXINE SODIUM 137 MCG
137 TABLET ORAL DAILY
Qty: 90 TABLET | Refills: 3 | Status: SHIPPED | OUTPATIENT
Start: 2025-06-16

## 2025-06-16 NOTE — TELEPHONE ENCOUNTER
Medication:   Requested Prescriptions     Pending Prescriptions Disp Refills    SYNTHROID 137 MCG tablet [Pharmacy Med Name: SYNTHROID 137MCG TABS] 90 tablet 3     Sig: TAKE ONE TABLET BY MOUTH EVERY DAY        Last Filled:      Patient Phone Number: [phone] (home)     Last appt: 4/4/2025   Next appt: Visit date not found    Last OARRS:        No data to display                  
no chest pain, no cough, and no shortness of breath.

## (undated) DEVICE — SNARE ENDOSCP L240CM LOOP W13MM DIA2.4MM SHT THROW SM OVL